# Patient Record
Sex: MALE | Race: WHITE | NOT HISPANIC OR LATINO | Employment: FULL TIME | URBAN - METROPOLITAN AREA
[De-identification: names, ages, dates, MRNs, and addresses within clinical notes are randomized per-mention and may not be internally consistent; named-entity substitution may affect disease eponyms.]

---

## 2019-11-01 ENCOUNTER — TRANSFERRED RECORDS (OUTPATIENT)
Dept: HEALTH INFORMATION MANAGEMENT | Facility: CLINIC | Age: 54
End: 2019-11-01

## 2020-12-16 ENCOUNTER — TRANSFERRED RECORDS (OUTPATIENT)
Dept: HEALTH INFORMATION MANAGEMENT | Facility: CLINIC | Age: 55
End: 2020-12-16

## 2021-01-06 ENCOUNTER — TRANSFERRED RECORDS (OUTPATIENT)
Dept: HEALTH INFORMATION MANAGEMENT | Facility: CLINIC | Age: 56
End: 2021-01-06

## 2021-01-07 ENCOUNTER — TRANSFERRED RECORDS (OUTPATIENT)
Dept: HEALTH INFORMATION MANAGEMENT | Facility: CLINIC | Age: 56
End: 2021-01-07

## 2021-01-08 ENCOUNTER — TRANSFERRED RECORDS (OUTPATIENT)
Dept: HEALTH INFORMATION MANAGEMENT | Facility: CLINIC | Age: 56
End: 2021-01-08

## 2021-01-08 ENCOUNTER — MEDICAL CORRESPONDENCE (OUTPATIENT)
Dept: HEALTH INFORMATION MANAGEMENT | Facility: CLINIC | Age: 56
End: 2021-01-08

## 2021-01-11 ENCOUNTER — TRANSCRIBE ORDERS (OUTPATIENT)
Dept: OTHER | Age: 56
End: 2021-01-11

## 2021-01-11 DIAGNOSIS — H70.10: ICD-10-CM

## 2021-01-11 DIAGNOSIS — H71.90: Primary | ICD-10-CM

## 2021-01-14 ENCOUNTER — TELEPHONE (OUTPATIENT)
Dept: OTOLARYNGOLOGY | Facility: CLINIC | Age: 56
End: 2021-01-14

## 2021-01-14 NOTE — TELEPHONE ENCOUNTER
Records Requested  01/14/21    Facility  [facility] recs fx. [phone]   Outcome Sent a fax for imaging reports and images

## 2021-01-14 NOTE — TELEPHONE ENCOUNTER
FUTURE VISIT INFORMATION      FUTURE VISIT INFORMATION:    Date: 2/4/2021    Time: 7:30AM    [place] [facility] [place]:    Referring provider:  Morales Blount MD    Referring providers clinic:  [facility]     Reason for visit/diagnosis  Cholesteatoma of ear & Mastoiditis, chronic // pe pt // Dr. Morales Blount at [facility] referring // recds sent to clinic    RECORDS REQUESTED FROM:       Clinic name Comments Records Status Imaging Status   [facility]  1/6/2021 note from Dr Blount     recs req 1/14/21 - received 1/14/21 11/1/2019 audiogram   12/23/2020, 12/16/2020 note from Dr Blount  12/9/2020, 11/1/2019 note from Dr Giovany Good    Images - req 1/14/21 - received   1/7/2021 CT IAC  1/7/2021 CT Maxillofacial  Scanned in EPIC req 1/14/21- received in  PACS   [facility] [place] 10/14/13 note from Dr Bennett  9/25/13 note from Dr Pete   8/8/2013 Audiogram     8/12/2013 Diode Laser Standby, Left Canal Wall Down Tympanomastoidectomy EPIC                              1/14/2021 9:27AM sent a fax to [facility] for audio and more recs - Amay   *received records from [facility] 2:34PM sent to scan. Images also received in PACS- Amay

## 2021-01-21 ENCOUNTER — DOCUMENTATION ONLY (OUTPATIENT)
Dept: CARE COORDINATION | Facility: CLINIC | Age: 56
End: 2021-01-21

## 2021-02-03 NOTE — PATIENT INSTRUCTIONS
1. You were seen in the ENT Clinic today by Dr. Pete.  If you have any questions or concerns after your appointment, please call   - Option 1: ENT Clinic: [phone]   - Option 2: [facility] (Dr. Pete's Nurse): [phone]    2.   Plan to return to clinic in 1 month with hearing test    Karo Lr LPN  [facility] - Otolaryngology

## 2021-02-04 ENCOUNTER — OFFICE VISIT (OUTPATIENT)
Dept: AUDIOLOGY | Facility: CLINIC | Age: 56
End: 2021-02-04
Payer: COMMERCIAL

## 2021-02-04 ENCOUNTER — OFFICE VISIT (OUTPATIENT)
Dept: OTOLARYNGOLOGY | Facility: CLINIC | Age: 56
End: 2021-02-04
Payer: COMMERCIAL

## 2021-02-04 ENCOUNTER — PRE VISIT (OUTPATIENT)
Dept: OTOLARYNGOLOGY | Facility: CLINIC | Age: 56
End: 2021-02-04

## 2021-02-04 VITALS
HEART RATE: 77 BPM | WEIGHT: 295.42 LBS | HEIGHT: 68 IN | TEMPERATURE: 98.5 F | OXYGEN SATURATION: 98 % | BODY MASS INDEX: 44.77 KG/M2

## 2021-02-04 DIAGNOSIS — H60.311 CHRONIC DIFFUSE OTITIS EXTERNA OF RIGHT EAR: Primary | ICD-10-CM

## 2021-02-04 DIAGNOSIS — H90.A31 MIXED CONDUCTIVE AND SENSORINEURAL HEARING LOSS OF RIGHT EAR WITH RESTRICTED HEARING OF LEFT EAR: Primary | ICD-10-CM

## 2021-02-04 DIAGNOSIS — E66.01 MORBID OBESITY (H): ICD-10-CM

## 2021-02-04 DIAGNOSIS — H90.0 CONDUCTIVE HEARING LOSS, BILATERAL: ICD-10-CM

## 2021-02-04 DIAGNOSIS — H65.93 OME (OTITIS MEDIA WITH EFFUSION), BILATERAL: ICD-10-CM

## 2021-02-04 DIAGNOSIS — H90.A12 CONDUCTIVE HEARING LOSS OF LEFT EAR WITH RESTRICTED HEARING OF RIGHT EAR: ICD-10-CM

## 2021-02-04 PROCEDURE — 87070 CULTURE OTHR SPECIMN AEROBIC: CPT | Mod: 90 | Performed by: PATHOLOGY

## 2021-02-04 PROCEDURE — 99000 SPECIMEN HANDLING OFFICE-LAB: CPT | Performed by: PATHOLOGY

## 2021-02-04 PROCEDURE — 87102 FUNGUS ISOLATION CULTURE: CPT | Mod: 90 | Performed by: PATHOLOGY

## 2021-02-04 PROCEDURE — 87107 FUNGI IDENTIFICATION MOLD: CPT | Mod: 90 | Performed by: PATHOLOGY

## 2021-02-04 PROCEDURE — 92557 COMPREHENSIVE HEARING TEST: CPT | Performed by: AUDIOLOGIST

## 2021-02-04 PROCEDURE — 99203 OFFICE O/P NEW LOW 30 MIN: CPT | Mod: 25 | Performed by: OTOLARYNGOLOGY

## 2021-02-04 PROCEDURE — 92567 TYMPANOMETRY: CPT | Mod: 52 | Performed by: AUDIOLOGIST

## 2021-02-04 PROCEDURE — 92504 EAR MICROSCOPY EXAMINATION: CPT | Performed by: OTOLARYNGOLOGY

## 2021-02-04 RX ORDER — LOSARTAN POTASSIUM 100 MG/1
100 TABLET ORAL
COMMUNITY

## 2021-02-04 ASSESSMENT — MIFFLIN-ST. JEOR: SCORE: 2139.5

## 2021-02-04 ASSESSMENT — PAIN SCALES - GENERAL: PAINLEVEL: NO PAIN (0)

## 2021-02-04 NOTE — PROGRESS NOTES
AUDIOLOGY REPORT    SUMMARY: Audiology visit completed. See audiogram for results.      RECOMMENDATIONS: Follow-up with ENT.          Torrie Freitas  Audiologist  [place] License  #[id number]

## 2021-02-04 NOTE — NURSING NOTE
"Chief Complaint   Patient presents with     Consult     Cholesteatoma of ear and mastoiditis       Pulse 77, temperature 98.5  F (36.9  C), temperature source Temporal, height 1.727 m (5' 8\"), weight 134 kg (295 lb 6.7 oz), SpO2 98 %.    Payton Kang, EMT    "

## 2021-02-04 NOTE — LETTER
2/4/2021       RE: Walter Dunn  [address]  [place] [facility] [address]     Dear Colleague,    Thank you for referring your patient, Walter Dunn, to the [facility] EAR NOSE AND THROAT CLINIC [place] at [facility]. Please see a copy of my visit note below.    Walter Dunn is seen in consultation from Dr. Blount. He underwent a left canal wall down tympanomastoidectomy in 2013 by myself for cholesteatoma and cholesterol granuloma. He has been seen at [facility] for left mastoid bowl debridement and has had no problems with his left ear. He does wear a hearing aid on that side. His right ear had been doing well except for this last year when he started to get some ear infections. His most recent infection started in December and has been ongoing since then despite drops and oral antibiotic therapy. His hearing is down which is problematic as his right ear used to be his better hearing ear. He has continued to have drainage although the pain is better.    Physical examination:  male in no acute distress.  Alert and answering questions appropriately.  HB 1/6 bilaterally.  Both ears examined under the microscope. Left mastoid cavity clean and healthy, there is a yellow effusion in the middle ear space with some air bubbles, TM is retracted with a band between the umbo and the superior canal. Right ear canal somewhat edematous with moisture and some wet cerumen which was cultured, TM thickened and there may be an effusion in the middle ear, no retractions noted.    Audiogram:  Audiogram from today was independently reviewed. Bilateral symmetric mild/moderate downsloping to severe primarily conductive loss with normal sensorineural hearing on the left and a normal downsloping to mild sensorineural component on the right, 96% speech discrimination on the right and 100% on the left, right flat normal volume tympanogram on the right,  tympanogram not done on the left. When compared to our audiogram from 2013, right hearing was normal and left had a moderate downsloping to severe conductive hearing loss.    Outside records from [place] were independently reviewed.   Notes were reviewed noting he was diagnosed with a fungal right otitis externa in December and his multiple courses of antibiotics and drops.   Audiogram from November 2019 showed right moderate upsloping to mild downsloping to moderate/severe conductive hearing loss and left mild downsloping to moderate/severe conductive hearing loss.   Outside CT from [place] from 1/7/2021 was independently reviewed. Left canal wall down mastoid cavity clean, stapes present with the TM on the stapes, no effusion noted. Right middle ear and mastoid with an effusion present, ossicular chain appears intact, no bone erosion noted.    Assessment and plan:  Right otitis media and right otitis media with effusion as well as left otitis media with effusion. We discussed that he would likely benefit from bilateral PE tube placement but that a PE tube cannot be placed on the right due to his active otitis externa. We will call with the culture results and get him on directed antimicrobial or antifungal therapy. He will follow dry ear precautions on the right. We'll see him back in a month and if the infection is improved, may place bilateral PE tubes. He understood and is agreeable with the plan.        Again, thank you for allowing me to participate in the care of your patient.      Sincerely,    Kristin Pete MD

## 2021-02-06 LAB
BACTERIA SPEC CULT: ABNORMAL
BACTERIA SPEC CULT: ABNORMAL
Lab: ABNORMAL
SPECIMEN SOURCE: ABNORMAL

## 2021-02-07 PROBLEM — E66.01 MORBID OBESITY (H): Status: ACTIVE | Noted: 2021-02-07

## 2021-02-07 NOTE — PROGRESS NOTES
Walter ANASTACIO Dunn is seen in consultation from Dr. Blount. He underwent a left canal wall down tympanomastoidectomy in 2013 by myself for cholesteatoma and cholesterol granuloma. He has been seen at [facility] for left mastoid bowl debridement and has had no problems with his left ear. He does wear a hearing aid on that side. His right ear had been doing well except for this last year when he started to get some ear infections. His most recent infection started in December and has been ongoing since then despite drops and oral antibiotic therapy. His hearing is down which is problematic as his right ear used to be his better hearing ear. He has continued to have drainage although the pain is better.    Physical examination:  male in no acute distress.  Alert and answering questions appropriately.  HB 1/6 bilaterally.  Both ears examined under the microscope. Left mastoid cavity clean and healthy, there is a yellow effusion in the middle ear space with some air bubbles, TM is retracted with a band between the umbo and the superior canal. Right ear canal somewhat edematous with moisture and some wet cerumen which was cultured, TM thickened and there may be an effusion in the middle ear, no retractions noted.    Audiogram:  Audiogram from today was independently reviewed. Bilateral symmetric mild/moderate downsloping to severe primarily conductive loss with normal sensorineural hearing on the left and a normal downsloping to mild sensorineural component on the right, 96% speech discrimination on the right and 100% on the left, right flat normal volume tympanogram on the right, tympanogram not done on the left. When compared to our audiogram from 2013, right hearing was normal and left had a moderate downsloping to severe conductive hearing loss.    Outside records from [facility] were independently reviewed.   Notes were reviewed noting he was diagnosed with a fungal right otitis externa in December and his  multiple courses of antibiotics and drops.   Audiogram from November 2019 showed right moderate upsloping to mild downsloping to moderate/severe conductive hearing loss and left mild downsloping to moderate/severe conductive hearing loss.   Outside CT from [place] from 1/7/2021 was independently reviewed. Left canal wall down mastoid cavity clean, stapes present with the TM on the stapes, no effusion noted. Right middle ear and mastoid with an effusion present, ossicular chain appears intact, no bone erosion noted.    Assessment and plan:  Right otitis media and right otitis media with effusion as well as left otitis media with effusion. We discussed that he would likely benefit from bilateral PE tube placement but that a PE tube cannot be placed on the right due to his active otitis externa. We will call with the culture results and get him on directed antimicrobial or antifungal therapy. He will follow dry ear precautions on the right. We'll see him back in a month and if the infection is improved, may place bilateral PE tubes. He understood and is agreeable with the plan.

## 2021-02-08 ENCOUNTER — TELEPHONE (OUTPATIENT)
Dept: OTOLARYNGOLOGY | Facility: CLINIC | Age: 56
End: 2021-02-08

## 2021-02-08 DIAGNOSIS — H92.11 OTORRHEA, RIGHT: Primary | ICD-10-CM

## 2021-02-08 NOTE — TELEPHONE ENCOUNTER
"\"Would you let him know that he's growing fungus on the right? Please put him on the compounded voriconazole drops 5 drops bid x 30 days. Keep the ear dry.\" per Dr. Pete      Pt is lindquist of above message. Pt was given pharmacy number to call if the don't call him.    Karo Lr LPN      "

## 2021-03-02 ENCOUNTER — TELEPHONE (OUTPATIENT)
Dept: OTOLARYNGOLOGY | Facility: CLINIC | Age: 56
End: 2021-03-02

## 2021-03-02 NOTE — TELEPHONE ENCOUNTER
Health Call Center    Phone Message    May a detailed message be left on voicemail: yes     Reason for Call: Other: Nurse Valorie from [facility] with Dr Blount called relaying a Pt message that he cannot afford the prescribed medication and wonders if there is another medication that Dr Pete would recommend. Also notes that Pt would prefer to have tubes inserted at [place] [facility] so that he does not have to travel into the city.     [facility] clinic would like to know that culture was done as well.     Please reach out to clinic to discuss at [phone] and ask for triage nurse. Thank you    Action Taken: Message routed to:  [facility] ENT    Travel Screening: Not Applicable

## 2021-03-02 NOTE — TELEPHONE ENCOUNTER
"Updated the nurse. We did an ear culture and fungal culture. Not sure about the alternative for the drops. Also they are fine doing the tubes but didn't want to step on Dr. Pete's \"toes\". Pt prefers going there for the tubes. Will update them once I hear back.    Karo Lr LPN    "

## 2021-03-03 DIAGNOSIS — H92.11 OTORRHEA, RIGHT: Primary | ICD-10-CM

## 2021-03-03 RX ORDER — CLOTRIMAZOLE 1 G/ML
SOLUTION TOPICAL
Qty: 15 ML | Refills: 0 | Status: SHIPPED | OUTPATIENT
Start: 2021-03-03 | End: 2021-03-17

## 2021-03-03 NOTE — TELEPHONE ENCOUNTER
"Pt notified of msg:    \"He can try clotrimazole to start with and go to [place] and have another culture done after 2-3 weeks of treatment. 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 Yes, they can do the tube once his infection has cleared\"    Per     Notified Valorie at [facility].    Karo Lr LPN    "

## 2021-03-04 LAB
FUNGUS SPEC CULT: NORMAL
Lab: NORMAL
SPECIMEN SOURCE: NORMAL

## 2022-11-01 ENCOUNTER — TRANSCRIBE ORDERS (OUTPATIENT)
Dept: OTHER | Age: 57
End: 2022-11-01

## 2022-11-01 DIAGNOSIS — H71.91 CHOLESTEATOMA OF RIGHT EAR: Primary | ICD-10-CM

## 2022-11-29 DIAGNOSIS — H90.0 CONDUCTIVE HEARING LOSS, BILATERAL: Primary | ICD-10-CM

## 2022-11-30 ENCOUNTER — TELEPHONE (OUTPATIENT)
Dept: OTOLARYNGOLOGY | Facility: CLINIC | Age: 57
End: 2022-11-30

## 2022-11-30 ENCOUNTER — OFFICE VISIT (OUTPATIENT)
Dept: OTOLARYNGOLOGY | Facility: CLINIC | Age: 57
End: 2022-11-30
Payer: COMMERCIAL

## 2022-11-30 ENCOUNTER — OFFICE VISIT (OUTPATIENT)
Dept: AUDIOLOGY | Facility: CLINIC | Age: 57
End: 2022-11-30
Payer: COMMERCIAL

## 2022-11-30 VITALS
HEIGHT: 68 IN | OXYGEN SATURATION: 100 % | BODY MASS INDEX: 43.33 KG/M2 | DIASTOLIC BLOOD PRESSURE: 90 MMHG | WEIGHT: 285.9 LBS | SYSTOLIC BLOOD PRESSURE: 146 MMHG | HEART RATE: 64 BPM

## 2022-11-30 DIAGNOSIS — H71.91 CHOLESTEATOMA OF RIGHT EAR: ICD-10-CM

## 2022-11-30 DIAGNOSIS — H90.0 CONDUCTIVE HEARING LOSS, BILATERAL: Primary | ICD-10-CM

## 2022-11-30 PROCEDURE — 99214 OFFICE O/P EST MOD 30 MIN: CPT | Mod: 25 | Performed by: OTOLARYNGOLOGY

## 2022-11-30 PROCEDURE — 92550 TYMPANOMETRY & REFLEX THRESH: CPT | Mod: 52 | Performed by: AUDIOLOGIST

## 2022-11-30 PROCEDURE — 92557 COMPREHENSIVE HEARING TEST: CPT | Performed by: AUDIOLOGIST

## 2022-11-30 PROCEDURE — 92504 EAR MICROSCOPY EXAMINATION: CPT | Mod: GC | Performed by: OTOLARYNGOLOGY

## 2022-11-30 RX ORDER — ACETAMINOPHEN 325 MG/1
975 TABLET ORAL ONCE
Status: CANCELLED | OUTPATIENT
Start: 2022-11-30 | End: 2022-11-30

## 2022-11-30 RX ORDER — CEFAZOLIN SODIUM IN 0.9 % NACL 3 G/100 ML
3 INTRAVENOUS SOLUTION, PIGGYBACK (ML) INTRAVENOUS SEE ADMIN INSTRUCTIONS
Status: CANCELLED | OUTPATIENT
Start: 2022-11-30

## 2022-11-30 RX ORDER — CEFAZOLIN SODIUM IN 0.9 % NACL 3 G/100 ML
3 INTRAVENOUS SOLUTION, PIGGYBACK (ML) INTRAVENOUS
Status: CANCELLED | OUTPATIENT
Start: 2022-11-30

## 2022-11-30 RX ORDER — ATORVASTATIN CALCIUM 20 MG/1
20 TABLET, FILM COATED ORAL DAILY
COMMUNITY
Start: 2022-10-14

## 2022-11-30 RX ORDER — DEXAMETHASONE SODIUM PHOSPHATE 4 MG/ML
10 INJECTION, SOLUTION INTRA-ARTICULAR; INTRALESIONAL; INTRAMUSCULAR; INTRAVENOUS; SOFT TISSUE ONCE
Status: CANCELLED | OUTPATIENT
Start: 2022-11-30 | End: 2022-11-30

## 2022-11-30 RX ORDER — PREDNISOLONE ACETATE 10 MG/ML
SUSPENSION/ DROPS OPHTHALMIC
COMMUNITY
Start: 2022-10-03 | End: 2023-09-21

## 2022-11-30 ASSESSMENT — PAIN SCALES - GENERAL: PAINLEVEL: NO PAIN (0)

## 2022-11-30 NOTE — LETTER
2022       RE: Walter Dunn  [address]  [place] [place] [facility] [address]     Dear Colleague,    Thank you for referring your patient, Walter Dunn, to the [facility] EAR NOSE AND THROAT CLINIC [place] at [facility]. Please see a copy of my visit note below.    Neurotology Clinic        Name: Walter Dunn  MRN: [id number]  Age: 57 year old year old  : 1965  Referring provider: Asael Galarza MD  Chief complaint: Right cholesteatoma    History of Present Illness:  Walter Dunn is seen in consultation from Asael Galarza MD.  He underwent a left canal wall down tympanomastoidectomy in  at [facility] for cholesteatoma and cholesterol granuloma. He has been seen at [facility] for left mastoid bowl debridement and has had no problems with his left ear. He does wear a hearing aid on that side. He was last seen here one year ago 21 when he was having issues with right sided ear infections requiring drops and oral antibiotics and worsened hearing on that side. We noted bilateral otitis media and right otitis externa. He was placed on dry ear precautions and drops and was told he will need PE tube placement once the otitis externa resolved. He ended up following up with his outside ENT at [place] and a TM perforation was noted on the right side so a tube was never placed. He continued to follow with that ENT with intermittent episodes of right ear drainage but was not put on drops. Eventually there became concern for cholesteatoma on the right side and he went to a different ENT for a second opinion where the cholesteatoma was again noted and a repeat CT was obtained (currently requesting records for this). He was told they could do the surgery for removal however given that he had previous surgery at [facility], he could come here as well so he chose to come back.     He says he was recently placed on drops 3 weeks ago and was told to  "continue them until his surgery. He does not know why he was placed on these as he says his ear was not and has not begun draining. He thinks his hearing is slightly better since he saw the new ENT provider who \"pulled on his ear drum\" and now it feels like it popped and can hear again. He denies any issues with his left ear over the past year.     Past Medical History:   Diagnosis Date     Broken nose      Hearing loss      Kidney stone      Nasal congestion      Sneezing      Sore throat     bother by uvula hanging too low and bothers him and makes him swallow     Tinnitus      Tobacco use      Weight gains      Past Surgical History:   Procedure Laterality Date     HERNIA REPAIR      x2     LASER DIODE TYMPANOMASTOIDECTOMY  2013    Procedure: LASER DIODE TYMPANOMASTOIDECTOMY;  Diode Laser Standby, Left Canal Wall Down Tympanomastoidectomy;  Surgeon: Kristin Pete MD;  Location: [facility] OR     LEG SURGERY      with screws and pins     Family History   Problem Relation Age of Onset     Allergies Unknown      Cardiovascular Father      Circulatory Father      Eye Disorder Unknown      Cancer Brother      Cancer Sister      Social History     Tobacco Use     Smoking status: Former     Types: Cigarettes     Quit date: 10/4/1982     Years since quittin.1     Smokeless tobacco: Current     Types: Chew     Tobacco comments:     Patient chew tobacco   Substance Use Topics     Alcohol use: No     Drug use: No   10 point review of systems is negative other than what is noted in HPI.     Physical examination:  Constitutional:  In no acute distress, appears stated age  Eyes:  Extraocular movements intact, no spontaneous nystagmus  Ears:  Both ears examined under the microscope.  - Right: External ear canal clear and healthy appearing. Over the posterior superior TM in pars flaccida there is a retraction pocket that extends down to mesotympanum with cholesteatoma seated inside. The mass appears to extending inferiorly in " the retraction pocket and the posterior extent cannot be visualized. Cholesteatoma unable to be suctioned or removed due to discomfort.   - Left: Mastoid cavity appears quite clean and healthy without significant crusting or buildup. TM is intact and middle ear appears well aerated.   Respiratory:  No increased work of breathing, wheezing or stridor  Musculoskeletal:  Good upper extremity strength  Skin:  No rashes on the head and neck  Neurologic:  House Brackman 1/6 bilaterally, ambulating normally  Psychiatric:  Alert, normal affect, answering questions appropriately     Audiogram: Audiogram from today was independently reviewed. Right primarily conductive hearing loss with a rapid drop at 8Khz to 70dB, left mild downsloping to moderate/severe conductive hearing loss.      Right: Speech reception threshold is 45 dB with 100% word recognition. Tympanogram C type   Left: Speech reception threshold is 45 dB with 100% word recognition. Tympanogram CNT     Audiogram was independently reviewed and compared to multiple older and outside audiograms.   Left hearing relatively unchanged compared to 2021, right hearing improved in the higher frequencies.  Compared to 2013, the left is relatively unchanged but the right has declined as it was in the normal to mild range.    Imaging: CT IAC was independently reviewed.   Right sided epitympanic cholesteatoma that appears to extend medial to the incus with associated erosion of the long process although the stapes may be intact. Posteriorly it does extend into the antrum. Mastoid is relatively well developed and partially opacified.    Outside notes from Dr. Galarza were independently reviewed and summarized above.    Assessment and plan:  Walter Dunn is a 57 year old male with a history of left CWD tympanomastoidectomy in 2013 for cholesteatoma and cholesterol granuloma who presents with new right sided cholesteatoma. During our clinic visit we did not have access to his  CT and so we had discussed the possibility of removing it transcanal, however after his visit we were able to visualize his imaging and given the size and location of the cholesteatoma extending into the antrum, he will likely need a tympanomastoidectomy. We will call him with this updated information. We did discuss the need for a second look procedure. He will need a tube placed long term in that ear which may be placed in the first procedure vs the second. Discussed risks which include but are not limited to: worsened hearing which may require further surgery, profound and irreversible hearing loss, dizziness, damage to the taste nerve, damage to the facial nerve, tympanic membrane perforation requiring further surgery and infection. It is likely that his hearing will be worse after the first surgery as a portion of the ossicular chain will likely need to be removed. Postoperative restrictions were discussed. The patient expressed understanding and is in agreement with this plan.  All questions were answered.    Teri López MD  Otolaryngology-Head & Neck Surgery PGY2    I, Kristin Pete MD, saw this patient with the resident/fellow and agree with the resident/fellow s findings and plan of care as documented in the resident s/fellow s note. I was present for the entire procedure.    Kristin Pete MD

## 2022-11-30 NOTE — PROGRESS NOTES
"  Neurotology Clinic        Name: Walter Dunn  MRN: [id number]  Age: 57 year old year old  : 1965  Referring provider: Asael Galarza MD  Chief complaint: Right cholesteatoma    History of Present Illness:  Walter Dunn is seen in consultation from Asael Galarza MD.  He underwent a left canal wall down tympanomastoidectomy in  at [facility] for cholesteatoma and cholesterol granuloma. He has been seen at [facility] for left mastoid bowl debridement and has had no problems with his left ear. He does wear a hearing aid on that side. He was last seen here one year ago 21 when he was having issues with right sided ear infections requiring drops and oral antibiotics and worsened hearing on that side. We noted bilateral otitis media and right otitis externa. He was placed on dry ear precautions and drops and was told he will need PE tube placement once the otitis externa resolved. He ended up following up with his outside ENT at [facility] and a TM perforation was noted on the right side so a tube was never placed. He continued to follow with that ENT with intermittent episodes of right ear drainage but was not put on drops. Eventually there became concern for cholesteatoma on the right side and he went to a different ENT for a second opinion where the cholesteatoma was again noted and a repeat CT was obtained (currently requesting records for this). He was told they could do the surgery for removal however given that he had previous surgery at [facility], he could come here as well so he chose to come back.     He says he was recently placed on drops 3 weeks ago and was told to continue them until his surgery. He does not know why he was placed on these as he says his ear was not and has not begun draining. He thinks his hearing is slightly better since he saw the new ENT provider who \"pulled on his ear drum\" and now it feels like it popped and can hear again. He denies any issues with his left " ear over the past year.     Past Medical History:   Diagnosis Date     Broken nose      Hearing loss      Kidney stone      Nasal congestion      Sneezing      Sore throat     bother by uvula hanging too low and bothers him and makes him swallow     Tinnitus      Tobacco use      Weight gains      Past Surgical History:   Procedure Laterality Date     HERNIA REPAIR      x2     LASER DIODE TYMPANOMASTOIDECTOMY  2013    Procedure: LASER DIODE TYMPANOMASTOIDECTOMY;  Diode Laser [place], Left Canal Wall Down Tympanomastoidectomy;  Surgeon: Kristin Pete MD;  Location: [facility] OR     LEG SURGERY      with screws and pins     Family History   Problem Relation Age of Onset     Allergies Unknown      Cardiovascular Father      Circulatory Father      Eye Disorder Unknown      Cancer Brother      Cancer Sister      Social History     Tobacco Use     Smoking status: Former     Types: Cigarettes     Quit date: 10/4/1982     Years since quittin.1     Smokeless tobacco: Current     Types: Chew     Tobacco comments:     Patient chew tobacco   Substance Use Topics     Alcohol use: No     Drug use: No   10 point review of systems is negative other than what is noted in HPI.     Physical examination:  Constitutional:  In no acute distress, appears stated age  Eyes:  Extraocular movements intact, no spontaneous nystagmus  Ears:  Both ears examined under the microscope.  - Right: External ear canal clear and healthy appearing. Over the posterior superior TM in pars flaccida there is a retraction pocket that extends down to mesotympanum with cholesteatoma seated inside. The mass appears to extending inferiorly in the retraction pocket and the posterior extent cannot be visualized. Cholesteatoma unable to be suctioned or removed due to discomfort.   - Left: Mastoid cavity appears quite clean and healthy without significant crusting or buildup. TM is intact and middle ear appears well aerated.   Respiratory:  No increased work of  breathing, wheezing or stridor  Musculoskeletal:  Good upper extremity strength  Skin:  No rashes on the head and neck  Neurologic:  House Brackman 1/6 bilaterally, ambulating normally  Psychiatric:  Alert, normal affect, answering questions appropriately     Audiogram: Audiogram from today was independently reviewed. Right primarily conductive hearing loss with a rapid drop at 8Khz to 70dB, left mild downsloping to moderate/severe conductive hearing loss.      Right: Speech reception threshold is 45 dB with 100% word recognition. Tympanogram C type   Left: Speech reception threshold is 45 dB with 100% word recognition. Tympanogram CNT     Audiogram was independently reviewed and compared to multiple older and outside audiograms.   Left hearing relatively unchanged compared to 2021, right hearing improved in the higher frequencies.  Compared to 2013, the left is relatively unchanged but the right has declined as it was in the normal to mild range.    Imaging: CT IAC was independently reviewed.   Right sided epitympanic cholesteatoma that appears to extend medial to the incus with associated erosion of the long process although the stapes may be intact. Posteriorly it does extend into the antrum. Mastoid is relatively well developed and partially opacified.    Outside notes from Dr. Galarza were independently reviewed and summarized above.    Assessment and plan:  Walter Dunn is a 57 year old male with a history of left CWD tympanomastoidectomy in 2013 for cholesteatoma and cholesterol granuloma who presents with new right sided cholesteatoma. During our clinic visit we did not have access to his CT and so we had discussed the possibility of removing it transcanal, however after his visit we were able to visualize his imaging and given the size and location of the cholesteatoma extending into the antrum, he will likely need a tympanomastoidectomy. We will call him with this updated information. We did discuss  the need for a second look procedure. He will need a tube placed long term in that ear which may be placed in the first procedure vs the second. Discussed risks which include but are not limited to: worsened hearing which may require further surgery, profound and irreversible hearing loss, dizziness, damage to the taste nerve, damage to the facial nerve, tympanic membrane perforation requiring further surgery and infection. It is likely that his hearing will be worse after the first surgery as a portion of the ossicular chain will likely need to be removed. Postoperative restrictions were discussed. The patient expressed understanding and is in agreement with this plan.  All questions were answered.    Teri López MD  Otolaryngology-Head & Neck Surgery PGY2    I, Kristin Pete MD, saw this patient with the resident/fellow and agree with the resident/fellow s findings and plan of care as documented in the resident s/fellow s note. I was present for the entire procedure.    Kristin Pete MD

## 2022-11-30 NOTE — TELEPHONE ENCOUNTER
Records Requested   November 30, 2022 9:25 AM  [id number]   Facility  [facility] Everywhere      Outcome Patient signed auth in clinic, sent to scan. Reports in care everywhere, images will be pushed shortly :  10/13/22 CT Temporal Bone   11/12/21 CT Temporal Bone

## 2022-11-30 NOTE — PROGRESS NOTES
AUDIOLOGY REPORT    SUMMARY: Audiology visit completed. See audiogram for results.      RECOMMENDATIONS: Follow-up with ENT.      Zak Gonzalez.  Licensed Audiologist  [place] #[id number]

## 2022-12-05 ENCOUNTER — TELEPHONE (OUTPATIENT)
Dept: OTOLARYNGOLOGY | Facility: CLINIC | Age: 57
End: 2022-12-05

## 2022-12-05 NOTE — TELEPHONE ENCOUNTER
Called patient to schedule surgery with Dr. Pete    Date of Surgery: 5/10/23 - offered patient 2/10 he said if he cant have the surgery before 2/1 it will need to be after 5/1    Location of surgery: [facility]    Pre-Op H&P: PCP RYNE Gustafson    Pre/Post Imaging:  Not Applicable    Discussed COVID-19 Testing: Not Applicable    Post-Op Appt Date: 3 weeks    Surgery Packet Mailed: 12/5      Additional comments: patient asked if he should continue using his ear drops. I told him that I would have to send a message over to Dr. Frazier nurse as I am unsure.        Carmelita Cadena on 12/5/2022 at 1:04 PM

## 2022-12-06 NOTE — TELEPHONE ENCOUNTER
Called patient and scheduled him at [place] on 1/20    Carmelita Cadena, Surgery Scheduler 12/6/2022 at 10:26 AM              ----- Message -----   From: Kristin Pete MD   Sent: 12/6/2022   8:33 AM CST   To: Kiersten Moura. Could you put him in 1/20 at [place]? May is too long. Things will be chewed off for sure by then. Thanks.

## 2022-12-22 ENCOUNTER — TELEPHONE (OUTPATIENT)
Dept: OTOLARYNGOLOGY | Facility: CLINIC | Age: 57
End: 2022-12-22

## 2022-12-27 NOTE — TELEPHONE ENCOUNTER
Patient called back stating that someone called regarding his pre-op and scheduling after surgery.     Instructed patient to call his PCP for a pre-op and his POST op is scheduled for him. He would like a reminder for surgery. Informed him that this would automatically sent and they will call 2-3 days prior to surgery with arrival time and instructions.

## 2023-01-11 ENCOUNTER — TRANSFERRED RECORDS (OUTPATIENT)
Dept: MULTI SPECIALTY CLINIC | Facility: CLINIC | Age: 58
End: 2023-01-11

## 2023-01-11 LAB
ALT SERPL-CCNC: 67 U/L (ref 21–72)
AST SERPL-CCNC: 39 U/L (ref 17–45)
CHOLESTEROL (EXTERNAL): 130 MG/DL (ref 0–200)
CREATININE (EXTERNAL): 0.8 MG/DL (ref 0.8–1.5)
GFR ESTIMATED (EXTERNAL): 101 ML/MIN (ref 60–137)
GLUCOSE (EXTERNAL): 96 MG/DL (ref 70–110)
HDLC SERPL-MCNC: 38 MG/DL (ref 34–100)
LDL CHOLESTEROL CALCULATED (EXTERNAL): 34 MG/DL (ref 75–130)
POTASSIUM (EXTERNAL): 4.5 MMOL/L (ref 3.5–5.5)
TRIGLYCERIDES (EXTERNAL): 288 MG/DL (ref 0–200)

## 2023-01-18 ENCOUNTER — TELEPHONE (OUTPATIENT)
Dept: OTOLARYNGOLOGY | Facility: CLINIC | Age: 58
End: 2023-01-18
Payer: COMMERCIAL

## 2023-01-18 NOTE — TELEPHONE ENCOUNTER
Spoke with patient to answer any questions he may have. Informed him our [facility] office would be reaching out to discuss arrival and NPO times.    Anna C. Schoenecker on 1/18/2023 at 12:18 PM     Was the patient seen in the last year in this department? Yes    Does patient have an active prescription for medications requested? Yes    Received Request Via: Pharmacy

## 2023-01-19 ENCOUNTER — ANESTHESIA EVENT (OUTPATIENT)
Dept: SURGERY | Facility: CLINIC | Age: 58
End: 2023-01-19
Payer: COMMERCIAL

## 2023-01-19 ASSESSMENT — COPD QUESTIONNAIRES
COPD: 1
CAT_SEVERITY: MILD

## 2023-01-19 ASSESSMENT — LIFESTYLE VARIABLES: TOBACCO_USE: 1

## 2023-01-19 NOTE — ANESTHESIA PREPROCEDURE EVALUATION
Anesthesia Pre-Procedure Evaluation    Patient: Walter Dunn   MRN: [id number] : 1965        Procedure : Procedure(s):  RIGHT TYMPANOMASTOIDECTOMY, WITH FACIAL NERVE MONITORING, WITH CARTILAGE BACKING  COMBINED MYRINGOTOMY, INSERT TUBE          Past Medical History:   Diagnosis Date     Broken nose      Hearing loss      Hypertension      Kidney stone      Nasal congestion      Sneezing      Sore throat     bother by uvula hanging too low and bothers him and makes him swallow     Tinnitus      Tobacco use      Weight gains       Past Surgical History:   Procedure Laterality Date     HERNIA REPAIR      x2     LASER DIODE TYMPANOMASTOIDECTOMY  2013    Procedure: LASER DIODE TYMPANOMASTOIDECTOMY;  Diode Laser [place], Left Canal Wall Down Tympanomastoidectomy;  Surgeon: Kristin Pete MD;  Location: [facility] OR     LEG SURGERY      with screws and pins      Allergies   Allergen Reactions     Contrast Dye Shortness Of Breath     Vicodin [Hydrocodone-Acetaminophen] Nausea     Oxycodone-Acetaminophen Anxiety      Social History     Tobacco Use     Smoking status: Former     Types: Cigarettes     Quit date: 10/4/1982     Years since quittin.3     Smokeless tobacco: Former     Types: Chew     Tobacco comments:     Patient chew tobacco   Substance Use Topics     Alcohol use: No      Wt Readings from Last 1 Encounters:   22 129.7 kg (285 lb 14.4 oz)        Anesthesia Evaluation   Pt has had prior anesthetic. Type: General.    No history of anesthetic complications       ROS/MED HX  ENT/Pulmonary: Comment: Cholesteatoma  Of note, he had a trache when he was 1 after swallowing a tack. Decannulated.    (+) GELY risk factors, snores loudly, hypertension, obese, tobacco use, Past use, mild,  COPD,     Neurologic:    (-) no seizures and no CVA   Cardiovascular:     (+) Dyslipidemia hypertension----- (-) murmur   METS/Exercise Tolerance: >4 METS    Hematologic:  - neg hematologic  ROS     Musculoskeletal:        GI/Hepatic:  - neg GI/hepatic ROS     Renal/Genitourinary:     (+) Nephrolithiasis ,     Endo:     (+) Obesity,     Psychiatric/Substance Use:       Infectious Disease:       Malignancy:       Other:      (+) , H/O Chronic Pain,        Physical Exam    Airway        Mallampati: III   TM distance: < 3 FB   Neck ROM: full   Mouth opening: > 3 cm    Respiratory Devices and Support         Dental       (+) Minor Abnormalities - some fillings, tiny chips    B=Bridge, C=Chipped, L=Loose, M=Missing    Cardiovascular   cardiovascular exam normal       Rhythm and rate: regular and normal (-) no murmur    Pulmonary   pulmonary exam normal        breath sounds clear to auscultation           OUTSIDE LABS:  CBC: No results found for: WBC, HGB, HCT, PLT  BMP: No results found for: NA, POTASSIUM, CHLORIDE, CO2, BUN, CR, GLC  COAGS: No results found for: PTT, INR, FIBR  POC: No results found for: BGM, HCG, HCGS  HEPATIC: No results found for: ALBUMIN, PROTTOTAL, ALT, AST, GGT, ALKPHOS, BILITOTAL, BILIDIRECT, ERIBERTO  OTHER: No results found for: PH, LACT, A1C, SERGEY, PHOS, MAG, LIPASE, AMYLASE, TSH, T4, T3, CRP, SED    Anesthesia Plan    ASA Status:  3   NPO Status:  NPO Appropriate    Anesthesia Type: General.     - Airway: ETT   Induction: Intravenous.   Maintenance: TIVA.   Techniques and Equipment:     - Airway: Video-Laryngoscope, Shoulder Afrhat/Ramp       - Drips/Meds: Remifentanil     Consents    Anesthesia Plan(s) and associated risks, benefits, and realistic alternatives discussed. Questions answered and patient/representative(s) expressed understanding.    - Discussed:     - Discussed with:  Patient      - Extended Intubation/Ventilatory Support Discussed: No.      - Patient is DNR/DNI Status: No    Use of blood products discussed: No .     Postoperative Care    Pain management: Multi-modal analgesia, Oral pain medications, IV analgesics.   PONV prophylaxis: Ondansetron (or other 5HT-3), Dexamethasone or Solumedrol      Comments:    Other Comments: Discussed risks of anesthesia including nausea, vomiting, sore throat, dental damage, cardiopulmonary complications, neurologic complication, and serious complications. Discussed with patient that he has several risk factors for sleep apnea, which if he has, could raise risk of perioperative complications.            Ivet Galarza MD

## 2023-01-20 ENCOUNTER — ANESTHESIA (OUTPATIENT)
Dept: SURGERY | Facility: CLINIC | Age: 58
End: 2023-01-20
Payer: COMMERCIAL

## 2023-01-20 ENCOUNTER — HOSPITAL ENCOUNTER (OUTPATIENT)
Facility: CLINIC | Age: 58
Discharge: HOME | End: 2023-01-20
Attending: OTOLARYNGOLOGY | Admitting: OTOLARYNGOLOGY
Payer: COMMERCIAL

## 2023-01-20 VITALS
HEIGHT: 68 IN | RESPIRATION RATE: 18 BRPM | OXYGEN SATURATION: 93 % | WEIGHT: 281.53 LBS | DIASTOLIC BLOOD PRESSURE: 81 MMHG | TEMPERATURE: 97.3 F | SYSTOLIC BLOOD PRESSURE: 126 MMHG | HEART RATE: 79 BPM | BODY MASS INDEX: 42.67 KG/M2

## 2023-01-20 DIAGNOSIS — H71.91 CHOLESTEATOMA OF RIGHT EAR: ICD-10-CM

## 2023-01-20 DIAGNOSIS — G89.18 POSTOPERATIVE PAIN: Primary | ICD-10-CM

## 2023-01-20 PROCEDURE — 360N000077 HC SURGERY LEVEL 4, PER MIN: Performed by: OTOLARYNGOLOGY

## 2023-01-20 PROCEDURE — 370N000017 HC ANESTHESIA TECHNICAL FEE, PER MIN: Performed by: OTOLARYNGOLOGY

## 2023-01-20 PROCEDURE — 258N000003 HC RX IP 258 OP 636: Performed by: STUDENT IN AN ORGANIZED HEALTH CARE EDUCATION/TRAINING PROGRAM

## 2023-01-20 PROCEDURE — 250N000011 HC RX IP 250 OP 636: Performed by: NURSE ANESTHETIST, CERTIFIED REGISTERED

## 2023-01-20 PROCEDURE — 88304 TISSUE EXAM BY PATHOLOGIST: CPT | Mod: TC | Performed by: OTOLARYNGOLOGY

## 2023-01-20 PROCEDURE — 250N000009 HC RX 250: Performed by: NURSE ANESTHETIST, CERTIFIED REGISTERED

## 2023-01-20 PROCEDURE — 250N000009 HC RX 250: Performed by: STUDENT IN AN ORGANIZED HEALTH CARE EDUCATION/TRAINING PROGRAM

## 2023-01-20 PROCEDURE — 710N000010 HC RECOVERY PHASE 1, LEVEL 2, PER MIN: Performed by: OTOLARYNGOLOGY

## 2023-01-20 PROCEDURE — 250N000011 HC RX IP 250 OP 636: Performed by: STUDENT IN AN ORGANIZED HEALTH CARE EDUCATION/TRAINING PROGRAM

## 2023-01-20 PROCEDURE — 272N000001 HC OR GENERAL SUPPLY STERILE: Performed by: OTOLARYNGOLOGY

## 2023-01-20 PROCEDURE — 710N000012 HC RECOVERY PHASE 2, PER MINUTE: Performed by: OTOLARYNGOLOGY

## 2023-01-20 PROCEDURE — 250N000013 HC RX MED GY IP 250 OP 250 PS 637: Performed by: OTOLARYNGOLOGY

## 2023-01-20 PROCEDURE — 250N000011 HC RX IP 250 OP 636: Performed by: OTOLARYNGOLOGY

## 2023-01-20 PROCEDURE — 250N000013 HC RX MED GY IP 250 OP 250 PS 637: Performed by: STUDENT IN AN ORGANIZED HEALTH CARE EDUCATION/TRAINING PROGRAM

## 2023-01-20 PROCEDURE — 250N000009 HC RX 250: Performed by: OTOLARYNGOLOGY

## 2023-01-20 PROCEDURE — 250N000025 HC SEVOFLURANE, PER MIN: Performed by: OTOLARYNGOLOGY

## 2023-01-20 PROCEDURE — 999N000141 HC STATISTIC PRE-PROCEDURE NURSING ASSESSMENT: Performed by: OTOLARYNGOLOGY

## 2023-01-20 PROCEDURE — 258N000003 HC RX IP 258 OP 636: Performed by: NURSE ANESTHETIST, CERTIFIED REGISTERED

## 2023-01-20 PROCEDURE — 88304 TISSUE EXAM BY PATHOLOGIST: CPT | Mod: 26 | Performed by: PATHOLOGY

## 2023-01-20 PROCEDURE — 69631 REPAIR EARDRUM STRUCTURES: CPT | Mod: 22 | Performed by: OTOLARYNGOLOGY

## 2023-01-20 PROCEDURE — 21235 EAR CARTILAGE GRAFT: CPT | Mod: 59 | Performed by: OTOLARYNGOLOGY

## 2023-01-20 RX ORDER — BACITRACIN 500 [USP'U]/G
OINTMENT OPHTHALMIC PRN
Status: DISCONTINUED | OUTPATIENT
Start: 2023-01-20 | End: 2023-01-20 | Stop reason: HOSPADM

## 2023-01-20 RX ORDER — HYDROMORPHONE HYDROCHLORIDE 1 MG/ML
0.2 INJECTION, SOLUTION INTRAMUSCULAR; INTRAVENOUS; SUBCUTANEOUS EVERY 5 MIN PRN
Status: DISCONTINUED | OUTPATIENT
Start: 2023-01-20 | End: 2023-01-20 | Stop reason: HOSPADM

## 2023-01-20 RX ORDER — NALOXONE HYDROCHLORIDE 0.4 MG/ML
0.4 INJECTION, SOLUTION INTRAMUSCULAR; INTRAVENOUS; SUBCUTANEOUS
Status: DISCONTINUED | OUTPATIENT
Start: 2023-01-20 | End: 2023-01-20 | Stop reason: HOSPADM

## 2023-01-20 RX ORDER — FENTANYL CITRATE 50 UG/ML
INJECTION, SOLUTION INTRAMUSCULAR; INTRAVENOUS PRN
Status: DISCONTINUED | OUTPATIENT
Start: 2023-01-20 | End: 2023-01-20

## 2023-01-20 RX ORDER — SODIUM CHLORIDE, SODIUM LACTATE, POTASSIUM CHLORIDE, CALCIUM CHLORIDE 600; 310; 30; 20 MG/100ML; MG/100ML; MG/100ML; MG/100ML
INJECTION, SOLUTION INTRAVENOUS CONTINUOUS
Status: DISCONTINUED | OUTPATIENT
Start: 2023-01-20 | End: 2023-01-20 | Stop reason: HOSPADM

## 2023-01-20 RX ORDER — FENTANYL CITRATE 50 UG/ML
50 INJECTION, SOLUTION INTRAMUSCULAR; INTRAVENOUS EVERY 5 MIN PRN
Status: DISCONTINUED | OUTPATIENT
Start: 2023-01-20 | End: 2023-01-20 | Stop reason: HOSPADM

## 2023-01-20 RX ORDER — ONDANSETRON 2 MG/ML
INJECTION INTRAMUSCULAR; INTRAVENOUS PRN
Status: DISCONTINUED | OUTPATIENT
Start: 2023-01-20 | End: 2023-01-20

## 2023-01-20 RX ORDER — PROPOFOL 10 MG/ML
INJECTION, EMULSION INTRAVENOUS PRN
Status: DISCONTINUED | OUTPATIENT
Start: 2023-01-20 | End: 2023-01-20

## 2023-01-20 RX ORDER — GLYCOPYRROLATE 0.2 MG/ML
INJECTION, SOLUTION INTRAMUSCULAR; INTRAVENOUS PRN
Status: DISCONTINUED | OUTPATIENT
Start: 2023-01-20 | End: 2023-01-20

## 2023-01-20 RX ORDER — ONDANSETRON 4 MG/1
4 TABLET, ORALLY DISINTEGRATING ORAL EVERY 30 MIN PRN
Status: DISCONTINUED | OUTPATIENT
Start: 2023-01-20 | End: 2023-01-20 | Stop reason: HOSPADM

## 2023-01-20 RX ORDER — EPHEDRINE SULFATE 50 MG/ML
INJECTION, SOLUTION INTRAMUSCULAR; INTRAVENOUS; SUBCUTANEOUS PRN
Status: DISCONTINUED | OUTPATIENT
Start: 2023-01-20 | End: 2023-01-20

## 2023-01-20 RX ORDER — CEFAZOLIN SODIUM/WATER 3 G/30 ML
3 SYRINGE (ML) INTRAVENOUS
Status: DISCONTINUED | OUTPATIENT
Start: 2023-01-20 | End: 2023-01-20 | Stop reason: HOSPADM

## 2023-01-20 RX ORDER — OXYCODONE HYDROCHLORIDE 5 MG/1
5 TABLET ORAL EVERY 4 HOURS PRN
Status: DISCONTINUED | OUTPATIENT
Start: 2023-01-20 | End: 2023-01-20 | Stop reason: HOSPADM

## 2023-01-20 RX ORDER — CEFAZOLIN SODIUM/WATER 3 G/30 ML
3 SYRINGE (ML) INTRAVENOUS SEE ADMIN INSTRUCTIONS
Status: DISCONTINUED | OUTPATIENT
Start: 2023-01-20 | End: 2023-01-20 | Stop reason: HOSPADM

## 2023-01-20 RX ORDER — ONDANSETRON 2 MG/ML
4 INJECTION INTRAMUSCULAR; INTRAVENOUS EVERY 30 MIN PRN
Status: DISCONTINUED | OUTPATIENT
Start: 2023-01-20 | End: 2023-01-20 | Stop reason: HOSPADM

## 2023-01-20 RX ORDER — EPINEPHRINE NASAL SOLUTION 1 MG/ML
SOLUTION NASAL PRN
Status: DISCONTINUED | OUTPATIENT
Start: 2023-01-20 | End: 2023-01-20 | Stop reason: HOSPADM

## 2023-01-20 RX ORDER — LABETALOL HYDROCHLORIDE 5 MG/ML
10 INJECTION, SOLUTION INTRAVENOUS
Status: DISCONTINUED | OUTPATIENT
Start: 2023-01-20 | End: 2023-01-20 | Stop reason: HOSPADM

## 2023-01-20 RX ORDER — LIDOCAINE HYDROCHLORIDE 20 MG/ML
INJECTION, SOLUTION INFILTRATION; PERINEURAL PRN
Status: DISCONTINUED | OUTPATIENT
Start: 2023-01-20 | End: 2023-01-20

## 2023-01-20 RX ORDER — OXYCODONE HYDROCHLORIDE 5 MG/1
5 TABLET ORAL EVERY 6 HOURS PRN
Qty: 6 TABLET | Refills: 0 | Status: SHIPPED | OUTPATIENT
Start: 2023-01-20 | End: 2023-01-23

## 2023-01-20 RX ORDER — HYDROMORPHONE HYDROCHLORIDE 1 MG/ML
0.3 INJECTION, SOLUTION INTRAMUSCULAR; INTRAVENOUS; SUBCUTANEOUS EVERY 5 MIN PRN
Status: DISCONTINUED | OUTPATIENT
Start: 2023-01-20 | End: 2023-01-20 | Stop reason: HOSPADM

## 2023-01-20 RX ORDER — ACETAMINOPHEN 325 MG/1
975 TABLET ORAL ONCE
Status: COMPLETED | OUTPATIENT
Start: 2023-01-20 | End: 2023-01-20

## 2023-01-20 RX ORDER — OFLOXACIN 3 MG/ML
5 SOLUTION AURICULAR (OTIC) 2 TIMES DAILY
Qty: 10 ML | Refills: 0 | Status: SHIPPED | OUTPATIENT
Start: 2023-01-20 | End: 2023-02-09

## 2023-01-20 RX ORDER — FENTANYL CITRATE 50 UG/ML
25 INJECTION, SOLUTION INTRAMUSCULAR; INTRAVENOUS EVERY 5 MIN PRN
Status: DISCONTINUED | OUTPATIENT
Start: 2023-01-20 | End: 2023-01-20 | Stop reason: HOSPADM

## 2023-01-20 RX ORDER — SODIUM CHLORIDE, SODIUM LACTATE, POTASSIUM CHLORIDE, CALCIUM CHLORIDE 600; 310; 30; 20 MG/100ML; MG/100ML; MG/100ML; MG/100ML
INJECTION, SOLUTION INTRAVENOUS CONTINUOUS PRN
Status: DISCONTINUED | OUTPATIENT
Start: 2023-01-20 | End: 2023-01-20

## 2023-01-20 RX ORDER — NALOXONE HYDROCHLORIDE 0.4 MG/ML
0.2 INJECTION, SOLUTION INTRAMUSCULAR; INTRAVENOUS; SUBCUTANEOUS
Status: DISCONTINUED | OUTPATIENT
Start: 2023-01-20 | End: 2023-01-20 | Stop reason: HOSPADM

## 2023-01-20 RX ORDER — DEXAMETHASONE SODIUM PHOSPHATE 4 MG/ML
10 INJECTION, SOLUTION INTRA-ARTICULAR; INTRALESIONAL; INTRAMUSCULAR; INTRAVENOUS; SOFT TISSUE ONCE
Status: COMPLETED | OUTPATIENT
Start: 2023-01-20 | End: 2023-01-20

## 2023-01-20 RX ORDER — PROPOFOL 10 MG/ML
INJECTION, EMULSION INTRAVENOUS CONTINUOUS PRN
Status: DISCONTINUED | OUTPATIENT
Start: 2023-01-20 | End: 2023-01-20

## 2023-01-20 RX ADMIN — PROPOFOL 150 MG: 10 INJECTION, EMULSION INTRAVENOUS at 07:40

## 2023-01-20 RX ADMIN — SODIUM CHLORIDE, POTASSIUM CHLORIDE, SODIUM LACTATE AND CALCIUM CHLORIDE: 600; 310; 30; 20 INJECTION, SOLUTION INTRAVENOUS at 09:20

## 2023-01-20 RX ADMIN — Medication 5 MG: at 07:59

## 2023-01-20 RX ADMIN — PROPOFOL 50 MG: 10 INJECTION, EMULSION INTRAVENOUS at 07:42

## 2023-01-20 RX ADMIN — FENTANYL CITRATE 75 MCG: 50 INJECTION, SOLUTION INTRAMUSCULAR; INTRAVENOUS at 07:40

## 2023-01-20 RX ADMIN — SUGAMMADEX 140 MG: 100 INJECTION, SOLUTION INTRAVENOUS at 07:41

## 2023-01-20 RX ADMIN — PHENYLEPHRINE HYDROCHLORIDE 0.5 MCG/KG/MIN: 10 INJECTION INTRAVENOUS at 08:10

## 2023-01-20 RX ADMIN — PROPOFOL 150 MCG/KG/MIN: 10 INJECTION, EMULSION INTRAVENOUS at 07:46

## 2023-01-20 RX ADMIN — REMIFENTANIL HYDROCHLORIDE 0.15 MCG/KG/MIN: 1 INJECTION, POWDER, LYOPHILIZED, FOR SOLUTION INTRAVENOUS at 07:46

## 2023-01-20 RX ADMIN — Medication 3 G: at 07:54

## 2023-01-20 RX ADMIN — DEXAMETHASONE SODIUM PHOSPHATE 10 MG: 4 INJECTION, SOLUTION INTRAMUSCULAR; INTRAVENOUS at 07:51

## 2023-01-20 RX ADMIN — PHENYLEPHRINE HYDROCHLORIDE 0.5 MCG/KG/MIN: 10 INJECTION INTRAVENOUS at 08:42

## 2023-01-20 RX ADMIN — ONDANSETRON 4 MG: 2 INJECTION INTRAMUSCULAR; INTRAVENOUS at 10:32

## 2023-01-20 RX ADMIN — ACETAMINOPHEN 975 MG: 325 TABLET ORAL at 05:54

## 2023-01-20 RX ADMIN — SODIUM CHLORIDE, POTASSIUM CHLORIDE, SODIUM LACTATE AND CALCIUM CHLORIDE: 600; 310; 30; 20 INJECTION, SOLUTION INTRAVENOUS at 07:34

## 2023-01-20 RX ADMIN — Medication 10 MG: at 07:49

## 2023-01-20 RX ADMIN — Medication 10 MG: at 07:40

## 2023-01-20 RX ADMIN — PHENYLEPHRINE HYDROCHLORIDE 200 MCG: 10 INJECTION INTRAVENOUS at 07:59

## 2023-01-20 RX ADMIN — GLYCOPYRROLATE 0.2 MG: 0.2 INJECTION, SOLUTION INTRAMUSCULAR; INTRAVENOUS at 08:03

## 2023-01-20 RX ADMIN — Medication 5 MG: at 07:54

## 2023-01-20 RX ADMIN — Medication 5 MG: at 07:46

## 2023-01-20 RX ADMIN — LIDOCAINE HYDROCHLORIDE 100 MG: 20 INJECTION, SOLUTION INFILTRATION; PERINEURAL at 07:40

## 2023-01-20 RX ADMIN — OXYCODONE HYDROCHLORIDE 5 MG: 5 TABLET ORAL at 11:27

## 2023-01-20 RX ADMIN — Medication 5 MG: at 09:51

## 2023-01-20 ASSESSMENT — ACTIVITIES OF DAILY LIVING (ADL)
ADLS_ACUITY_SCORE: 22

## 2023-01-20 ASSESSMENT — ENCOUNTER SYMPTOMS: SEIZURES: 0

## 2023-01-20 NOTE — ANESTHESIA POSTPROCEDURE EVALUATION
Patient: Walter Dunn    Procedure: Procedure(s):  RIGHT TYMPANOPLASTY, WITH FACIAL NERVE MONITORING, WITH CARTILAGE BACKING  COMBINED MYRINGOTOMY, INSERT TUBE RIGHT EAR       Anesthesia Type:  General    Note:  Disposition: Outpatient   Postop Pain Control: Uneventful            Sign Out: Well controlled pain   PONV: No   Neuro/Psych: Uneventful            Sign Out: Acceptable/Baseline neuro status   Airway/Respiratory: Uneventful            Sign Out: Acceptable/Baseline resp. status   CV/Hemodynamics: Uneventful            Sign Out: Acceptable CV status; No obvious hypovolemia; No obvious fluid overload   Other NRE: NONE   DID A NON-ROUTINE EVENT OCCUR? No    Event details/Postop Comments:  Sats low 90s, as preop. Patient's questions answered. He feels as though he had urinary retention, but was able to urinate.           Last vitals:  Vitals Value Taken Time   /99 01/20/23 1128   Temp 36.3  C (97.3  F) 01/20/23 1128   Pulse 96 01/20/23 1122   Resp 18 01/20/23 1128   SpO2 90 % 01/20/23 1129   Vitals shown include unvalidated device data.    Electronically Signed By: Ivet Galarza MD  January 20, 2023  2:20 PM

## 2023-01-20 NOTE — ANESTHESIA CARE TRANSFER NOTE
Patient: Walter Dunn    Procedure: Procedure(s):  RIGHT TYMPANOPLASTY, WITH FACIAL NERVE MONITORING, WITH CARTILAGE BACKING  COMBINED MYRINGOTOMY, INSERT TUBE RIGHT EAR       Diagnosis: Cholesteatoma of right ear [H71.91]  Diagnosis Additional Information: No value filed.    Anesthesia Type:   General     Note:    Oropharynx: oropharynx clear of all foreign objects  Level of Consciousness: awake  Oxygen Supplementation: face mask    Independent Airway: airway patency satisfactory and stable  Dentition: dentition unchanged  Vital Signs Stable: post-procedure vital signs reviewed and stable  Report to RN Given: handoff report given  Patient transferred to: PACU    Handoff Report: Identifed the Patient, Identified the Reponsible Provider, Reviewed the pertinent medical history, Discussed the surgical course, Reviewed Intra-OP anesthesia mangement and issues during anesthesia, Set expectations for post-procedure period and Allowed opportunity for questions and acknowledgement of understanding      Vitals:  Vitals Value Taken Time   /92 01/20/23 1050   Temp     Pulse 93 01/20/23 1053   Resp 19 01/20/23 1053   SpO2 97 % 01/20/23 1053   Vitals shown include unvalidated device data.    Electronically Signed By: ISI Villanueva CRNA  January 20, 2023  10:54 AM

## 2023-01-20 NOTE — ANESTHESIA PROCEDURE NOTES
Airway       Patient location during procedure: OR       Procedure Start/Stop Times: 1/20/2023 7:43 AM  Staff -        CRNA: Naima Perkins APRN CRNA       Performed By: CRNA  Consent for Airway        Urgency: elective  Indications and Patient Condition       Indications for airway management: azam-procedural       Induction type:intravenous       Mask difficulty assessment: 1 - vent by mask    Final Airway Details       Final airway type: endotracheal airway       Successful airway: ETT - single and Oral  Endotracheal Airway Details        ETT size (mm): 7.5       Cuffed: yes       Successful intubation technique: video laryngoscopy       VL Blade Size: MAC D Blade       Grade View of Cords: 1       Adjucts: stylet       Position: Left       Measured from: gums/teeth       Secured at (cm): 24       Bite block used: None    Post intubation assessment        Placement verified by: capnometry, equal breath sounds and chest rise        Number of attempts at approach: 1       Secured with: silk tape       Ease of procedure: easy       Dentition: Intact and Unchanged    Medication(s) Administered   Medication Administration Time: 1/20/2023 7:43 AM

## 2023-01-20 NOTE — BRIEF OP NOTE
[facility]    Brief Operative Note    Pre-operative diagnosis: Cholesteatoma of right ear [H71.91]  Post-operative diagnosis Same as pre-operative diagnosis    Procedure:   Right tympanoplasty with facial nerve monitoring and cartilage backing  Right tragal cartilage harvest  Right myringotomy with pressure equalizing tube placement    Surgeon: Surgeon(s) and Role:     * Kristin Pete MD - Primary  Anesthesia: General   Estimated Blood Loss: 10mL    Drains: None  Specimens:   ID Type Source Tests Collected by Time Destination   1 : Right middle ear content Tissue Middle Ear Contents, Right SURGICAL PATHOLOGY EXAM Kristin Pete MD 1/20/2023  8:45 AM      Findings:   Cholesteatoma and granulation tissue involving the ossicles, epitympanum, sinus tympani. Almost complete erosion of incus and stapes.   Complications: None.  Implants: * No implants in log *

## 2023-01-20 NOTE — DISCHARGE INSTRUCTIONS
"1. Resume your home medications. Take pain medication, Tylenol or ibuprofen as needed. Use docusate to avoid constipation. Start your ear drops in 1 week after surgery and use until your follow up.    2. Wound care  * Change the cotton ball in your ear as needed for drainage.  It's normal to expect some drainage from your ear for the first few days after surgery.    3. Use a cotton ball coated with vasoline to keep water out of ear canal.    4. For the next week, no heavy lifting more than 5 pounds, no strenuous activities. No nose blowing. Sneeze with your mouth open.    5. Please call MD or come to the ED for shortness of breath, trouble breathing, inability to tolerate liquids, intractable dizziness, or signs of infection such as fevers or redness.      Call the [phone] clinic number if you have any questions and concerns during the day and call [phone] at night and ask for \"ENT resident on call\".     6. Follow up with Dr. Pete as previously scheduled.    Same-Day Surgery   Adult Discharge Orders & Instructions     For 24 hours after surgery:  Get plenty of rest.  A responsible adult must stay with you for at least 24 hours after you leave the hospital.   Pain medication can slow your reflexes. Do not drive or use heavy equipment.  If you have weakness or tingling, don't drive or use heavy equipment until this feeling goes away.  Mixing alcohol and pain medication can cause dizziness and slow your breathing. It can even be fatal. Do not drink alcohol while taking pain medication.  Avoid strenuous or risky activities.  Ask for help when climbing stairs.   You may feel lightheaded.  If so, sit for a few minutes before standing.  Have someone help you get up.   If you have nausea (feel sick to your stomach), drink only clear liquids such as apple juice, ginger ale, broth or 7-Up.  Rest may also help.  Be sure to drink enough fluids.  Move to a regular diet as you feel able. Take pain medications with a " small amount of solid food, such as toast or crackers, to avoid nausea.   A slight fever is normal. Call the doctor if your fever is over 100 F (37.7 C) (taken under the tongue) or lasts longer than 24 hours.  You may have a dry mouth, muscle aches, trouble sleeping or a sore throat.  These symptoms should go away after 24 hours.  Do not make important or legal decisions.   Pain Management:      1. Take pain medication (if prescribed) for pain as directed by your physician.        2. WARNING: If the pain medication you have been prescribed contains Tylenol  (acetaminophen), DO NOT take additional doses of Tylenol (acetaminophen).     Call your doctor for any of the followin.  Signs of infection (fever, growing tenderness at the surgery site, severe pain, a large amount of drainage or bleeding, foul-smelling drainage, redness, swelling).    2.  It has been over 8 to 10 hours since surgery and you are still not able to urinate (pee).    3.  Headache for over 24 hours.    4.  Numbness, tingling or weakness the day after surgery (if you had spinal anesthesia).  To contact a doctor, call Dr Pete's clinic at [phone] or:  '   [phone] and ask for the Resident On Call for:          ENT surgery(answered 24 hours a day)  '   Emergency Department:  [place] [facility]: [phone]  [place] [facility]: [phone]               Rev. 10/2014     [facility]  Kristin Pete MD    Caring for Yourself after Tympanoplasty or Mastoidectomy    What to expect after surgery:  Nausea, dizziness or unsteadiness: This is normal because the ear is involved with your sense of balance.  Crackling, popping or ringing sounds in the ear: This is normal and usually goes away in a few weeks.  Small to moderate amount of ear drainage (may be bloody) for 24-48 hours.    Care after surgery:  Keep head of bed up with 1-2 pillows (or use a folded blanket for infants) for about 1 week  after surgery.  Use the Yrn (Velcro cup ear covering) for the first 24 hours and then afterwards as needed.  Keep the ear dry with cotton ball and Vaseline if excessive drainage is noted. You may leave a cotton ball with Vaseline in the ear if drainage continues.  Change the cotton ball as needed.  In most cases, dissolvable packing is used in the ear. In some cases, gauze packing is used. Do not remove any packing from inside the ear canal. Your doctor will take out any removable packing at a clinic appointment.   After the packing has been removed, protect the ear by placing a cotton ball, swabbed in Vaseline, gently into the outer part of the ear before bathing or taking a shower. Do this until the ear canal is healed.    If glasses are worn, remove the bow on the incision (surgery wound) side. This will avoid pressure near the incision while it heals. Healing takes about 2 to 3 weeks.     Things to Avoid:  Do not loosen/remove the bandage or packing inside the ear canal.  Do not move quickly.  Do not shake your head.  Do not lie flat in bed.  Do not allow water, soap or shampoo to get into the ear canal. This could lead to infection.    Activity:  No heavy lifting, strenuous activity, contact sports, gym class for 2-3 weeks.  No air travel for 2-3 weeks after surgery.  No swimming for 2-3 weeks after surgery.  Sneeze with mouth open to prevent pressure from building up in the middle ear.     Pain:  A small to moderate amount of pain is expected after surgery. Give your child Tylenol or the pain medication that the doctor has prescribed.    Follow up:  ENT follow up in 2-3 weeks after surgery; this should be previously scheduled.  Your child s hearing will be checked by audiology three months after surgery to evaluate improvement; this should be previously scheduled.  If you need to schedule your clinic follow up exam, please call [phone].    When to call us:  Fresh blood, pus or swelling from the  incision  Fever higher than 100.5 F rectally or 99.5 F under the arm that lasts more than 24 hours  Extreme irritability of difficulty to console  Facial weakness  Persistent dizziness/unsteady gait that lasts more than 7 days    Important Phone Numbers:  [facility]--Pediatric ENT Clinic  During office hours: [phone]  After hours: [phone] (ask to page the Pediatric ENT resident who is on-call)    Rev. 5/2018

## 2023-01-20 NOTE — INTERVAL H&P NOTE
"I have reviewed the surgical (or preoperative) H&P that is linked to this encounter, and examined the patient. There are no significant changes    Clinical Conditions Present on Arrival:  Clinically Significant Risk Factors Present on Admission                    # Severe Obesity: Estimated body mass index is 42.81 kg/m  as calculated from the following:    Height as of this encounter: 1.727 m (5' 8\").    Weight as of this encounter: 127.7 kg (281 lb 8.4 oz).       "

## 2023-01-23 NOTE — OP NOTE
Date of service:  1/20/23    Preoperative diagnosis:  Cholesteatoma and eustachian tube dysfunction and morbid obesity (BMI 42.81)    Postoperative diagnosis:  Cholesteatoma, eustachian tube dysfunction and granulation tissue and morbid obesity (BMI 42.81)    Procedure:  1.  Right tympanoplasty with cartilage backing  2.  Right myringotomy and t-tube placement  3.  Facial nerve monitoring x 3 hours    Surgeon:  Kristin Pete MD    Resident:  Agustin Keane MD    Anesthesia:  General    EBL:  10cc    Specimens:  None    Complications:  None    Findings:  Cholesteatoma within the epitympanum, granulation tissue filling the mesotympanum and antrum, absent long process of the incus, absent stapes suprastructure other than a small posterior crural remnant, stapes footplate intact with mucosa overlying it.    Indications:  Walter Dunn is a patient with a right cholesteatoma and eustachian tube dysfunction.  Discussion was had about tympanomastoidectomy and t-tube placement, possible tympanoplasty.  Risks and benefits had been discussed and consent had been obtained.    Procedure:  The patient was taken to the operating room and placed supine on the operating room table. General anesthesia was induced and endotracheal intubation was performed. 70% nitrous was administered for 10 minutes. Time Out was then performed with confirmation of the patient, site and procedure. Facial nerve electrodes were placed on the right side of the face. Impedances were checked and the nerve was monitored for the entirety of the case.  1:100,000 epinephrine was injected into the ear canal, tragus and postauricular region.  The area was prepped and draped in standard surgical fashion.  The operating microscope was brought into position and used for the entirety of the case.  The ear canal was inspected and irrigated with normal saline.  The tympanic membrane was inspected. There is noted to be a deep mesotympanic retraction pocket that  goes posterior superior with some squamous debris within the pocket which was removed. The anterior TM was insufflated outwards but not the pocket. Myringotomy incision was made in the anterior quadrant and a t-tube was placed. No effusion was noted in the middle ear. Canal incisions were made and the tympanomeatal flap was elevated. The hypotympanum was entered and noted to be clear. As the TM was elevated superiorly, there is noted to be granulation tissue filling the mesotympanum and extending into the sinus tympani. The chorda tympani was identified and freed off the TM and the surrounding granulation tissue. There is a fair amount of bleeding from the granulation tissue. It was taken out of the sinus tympani. The mesotympanum was explored and no ossicles were palpable. There is noted to be significant scutal erosion from the retraction. The scutum was further curetted away in order to expose the antrum. This allowed the granulation tissue to be elevated out of the antrum. As this was done, there was noted to be cholesteatoma within the epitympanum but the tissue within the antrum appeared to be all granulation tissue. The granulation tissue out of the sinus tympani was able to be connected to the tissue within the antrum. The mesotympanum was again explored and the granulation tissue peeled anteriorly away from the sinus tympani. There was noted to be a remnant of a stapes posterior crura but no anterior crura or capitulum and no long process of the incus. The area of the stapes footplate was identified and the footplate was noted to be intact with some thick mucosa overlying it which was left in place as the footplate appeared hypermobile. The TM was incised anterior to the epitympanic retraction pocket and the TM taken off the neck of the malleus. There is cholesteatoma within the epitympanum. The neck of the malleus was cut and the head was removed. This allowed the anterior epitympanum to be explored and  cleaned of cholesteatoma. The tensor tympani was further excised. The chorda tympani is noted to be going through the cholesteatoma so it too was excised. Once the cholesteatoma and granulation tissue had been removed, the bleeding abated significantly. There was some thick mucosa on the floor of the middle ear but no further evidence of cholesteatoma. There was noted to be a perforation in the posterior superior quadrant where the retraction pocket with cholesteatoma was removed as well as an anterior perforation anterior to the umbo in continuity with the myringotomy for the t-tube. Tragal incision was made and an approximately 5mm x 7mm piece of cartilage with perichondrium was harvested. The incision was closed with chromic suture and the cartilage prepared for grafting purposes. The epitympanum and mesotympanum was filled with gelfoam. A cartilage graft with perichondrium was placed under the posterior superior perforation. The anterior middle ear was filled with gelfoam and a perichondrial graft was placed to cover the anterior perforation and placed against the t-tube. The tympanomeatal flap was placed back into position and gelfoam used to cover the grafts and seal the incision.  Bacitracin was used to fill the ear canal.  A cotton ball was placed over the ear canal and the facial nerve electrodes removed.  The patient tolerated the procedure well and counts were correct.  The patient was returned to Anesthesia, awakened, extubated and taken to the PACU in stable condition.    Please note this procedure was more difficult than usual due to the patient's morbid obesity making access to the ear difficult as well as the hypervascular nature of the granulation tissue. This resulted in at least 20% more time and effort to complete the case.

## 2023-01-24 LAB
PATH REPORT.COMMENTS IMP SPEC: NORMAL
PATH REPORT.COMMENTS IMP SPEC: NORMAL
PATH REPORT.FINAL DX SPEC: NORMAL
PATH REPORT.GROSS SPEC: NORMAL
PATH REPORT.MICROSCOPIC SPEC OTHER STN: NORMAL
PATH REPORT.RELEVANT HX SPEC: NORMAL
PHOTO IMAGE: NORMAL

## 2023-01-27 ENCOUNTER — TELEPHONE (OUTPATIENT)
Dept: OTOLARYNGOLOGY | Facility: CLINIC | Age: 58
End: 2023-01-27
Payer: COMMERCIAL

## 2023-01-27 NOTE — TELEPHONE ENCOUNTER
[facility] Call Center    Phone Message    May a detailed message be left on voicemail: yes     Reason for Call: Other: Per spouse she would like to know when and what they are to be doin with the packing that is inside his ears. Please reachoout to spouse. Thank you      Action Taken: Message routed to:  [facility] [place] [facility] [place]    [place] Screening: Not Applicable

## 2023-02-08 NOTE — PROGRESS NOTES
"  Otolaryngology Clinic      Name: Walter Dunn  MRN: [id number]  Age: 57 year old  : 2025      Chief Complaint:   Follow up    History of Present Illness:   Walter Dunn is a 57 year old male with a history of left canal wall down tympanomastoidectomy in  at [facility] for cholesteatoma, cholesterol granuloma, and right cholesteatoma s/p right tympanoplasty with cartilage backing and right myringotomy and t-tube placement 2023.    Today, he reports that his ear has been feeling okay. He thinks that most of the packing came out of the ear or dissolved. He had a few days where he felt his hearing was improved, but it now feels muffled.    Physical Exam:   /71   Pulse 80   Temp 98.2  F (36.8  C)   Ht 1.753 m (5' 9\")   Wt 131.1 kg (289 lb)   SpO2 94%   BMI 42.68 kg/m       Male in no acute distress.  Alert and answering questions appropriately.  HB 1/6 bilaterally.  Right ear examined under the microscope. Packing removed with suction. Some packing adhered to healing incision of EAC. T-tube in place and patent.     Assessment and Plan:  Walter Dunn is a 57 year old male who presents for follow up s/p right tympanoplasty with cartilage backing and right myringotomy and t-tube placement 2023. He is healing well, and his T-tube is in place and patent. I was able to suction some of the packing today. I recommended that he continue Floxin ear drops and start steroid ear drops. He should also continue to keep the ear dry. He can follow up with me in 1 month.      Scribe Disclosure:  I, Leonel Parks, am serving as a scribe to document services personally performed by Kristin Pete MD at this visit, based upon the provider's statements to me. All documentation has been reviewed by the aforementioned provider prior to being entered into the official medical record.    The documentation recorded by the scribe accurately reflects the services I personally performed and " the decisions made by me.

## 2023-02-09 ENCOUNTER — OFFICE VISIT (OUTPATIENT)
Dept: OTOLARYNGOLOGY | Facility: CLINIC | Age: 58
End: 2023-02-09
Payer: COMMERCIAL

## 2023-02-09 VITALS
WEIGHT: 289 LBS | HEART RATE: 80 BPM | SYSTOLIC BLOOD PRESSURE: 123 MMHG | HEIGHT: 69 IN | DIASTOLIC BLOOD PRESSURE: 71 MMHG | BODY MASS INDEX: 42.8 KG/M2 | OXYGEN SATURATION: 94 % | TEMPERATURE: 98.2 F

## 2023-02-09 DIAGNOSIS — H92.11 OTORRHEA, RIGHT: ICD-10-CM

## 2023-02-09 DIAGNOSIS — H90.0 CONDUCTIVE HEARING LOSS, BILATERAL: Primary | ICD-10-CM

## 2023-02-09 DIAGNOSIS — H71.91 CHOLESTEATOMA OF RIGHT EAR: ICD-10-CM

## 2023-02-09 PROCEDURE — 99024 POSTOP FOLLOW-UP VISIT: CPT | Performed by: OTOLARYNGOLOGY

## 2023-02-09 RX ORDER — OFLOXACIN 3 MG/ML
5 SOLUTION AURICULAR (OTIC) 2 TIMES DAILY
Qty: 10 ML | Refills: 0 | Status: SHIPPED | OUTPATIENT
Start: 2023-02-09 | End: 2023-07-28

## 2023-02-09 RX ORDER — PREDNISOLONE ACETATE 10 MG/ML
SUSPENSION/ DROPS OPHTHALMIC
Qty: 15 ML | Refills: 0 | Status: SHIPPED | OUTPATIENT
Start: 2023-02-09 | End: 2023-02-19

## 2023-02-09 ASSESSMENT — PAIN SCALES - GENERAL: PAINLEVEL: NO PAIN (0)

## 2023-02-09 NOTE — PATIENT INSTRUCTIONS
You were seen in the ENT Clinic today by Dr. Pete.  If you have any questions or concerns after your appointment, please call   - Option 1: ENT Clinic: [phone]   - Option 2: Karo (Dr. Pete's Nurse): [phone]                   Christine (Dr. Pete's Nurse): [phone]    2.   A refill of your ofloxacin drops have been sent as well as a new prescription for Pred Forte drops. You will apply both of these to the right ear. Please be sure to use the Pred Forte after the ofloxacin.    3.   Plan to return to clinic in 1 month with hearing test prior.        How to Contact Us:  Send a [employer] message to your provider. Our team will respond to you via [employer]. Occasionally, we will need to call you to get further information.  For urgent matters (Monday-Friday), call the ENT Clinic: [phone] and speak with a call center team member - they will route your call appropriately.   If you'd like to speak directly with a nurse, please find our contact information below. We do our best to check voicemail frequently throughout the day, and will work to call you back within 1-2 days. For urgent matters, please use the general clinic phone numbers listed above.

## 2023-02-09 NOTE — NURSING NOTE
"Chief Complaint   Patient presents with     RECHECK     Post op      Blood pressure 123/71, pulse 80, temperature 98.2  F (36.8  C), height 1.753 m (5' 9\"), weight 131.1 kg (289 lb), SpO2 94 %.    Olman Betts LPN    "

## 2023-02-09 NOTE — Clinical Note
"2023       RE: Walter Dunn  [address]  [place] [address]     Dear Colleague,    Thank you for referring your patient, Walter Dunn, to the [facility] [place] [facility]. Please see a copy of my visit note below.      Otolaryngology Clinic      Name: Walter Dunn  MRN: [id number]  Age: 57 year old  : 2025      Chief Complaint:   Follow up    History of Present Illness:   Walter Dunn is a 57 year old male with a history of left canal wall down tympanomastoidectomy in  at [facility] for cholesteatoma, cholesterol granuloma, and right cholesteatoma who presents for follow up s/p right tympanoplasty with cartilage backing and right myringotomy and t-tube placement 2023.    Today, he reports that his ear has been feeling okay. He thinks that most of the packing came out of the ear or dissolved. He had a few days where he felt his hearing was improved, but it now feels muffled.    Physical Exam:   /71   Pulse 80   Temp 98.2  F (36.8  C)   Ht 1.753 m (5' 9\")   Wt 131.1 kg (289 lb)   SpO2 94%   BMI 42.68 kg/m       Physical examination:  Male in no acute distress.  Alert and answering questions appropriately.  HB 1/6 bilaterally.  Right ears examined under the microscope. Packing removed with suction. Some packing adhered to healing tissue of EAC. T-tube in place and patent.     Assessment and Plan:  Walter Dunn is a 57 year old male who presents for follow up s/p right tympanoplasty with cartilage backing and right myringotomy and t-tube placement 2023. He is healing well, and his T-tube is in place and patent. I was able to suction some of the packing today. I recommended that he continue Floxin ear drops and start steroid ear drops. He should also continue to keep the ear dry. He can follow up with me in 1 month.      Scribe Disclosure:  I, Leonel Parks, " am serving as a scribe to document services personally performed by Kristin Pete MD at this visit, based upon the provider's statements to me. All documentation has been reviewed by the aforementioned provider prior to being entered into the official medical record.      Again, thank you for allowing me to participate in the care of your patient.      Sincerely,    Kristin Pete MD

## 2023-02-09 NOTE — LETTER
"2023      RE: Walter Dunn  [address]  [facility] [address]         Otolaryngology Clinic      Name: Walter Dunn  MRN: [id number]  Age: 57 year old  : 2025      Chief Complaint:   Follow up    History of Present Illness:   Walter Dunn is a 57 year old male with a history of left canal wall down tympanomastoidectomy in  at [facility] for cholesteatoma, cholesterol granuloma, and right cholesteatoma s/p right tympanoplasty with cartilage backing and right myringotomy and t-tube placement 2023.    Today, he reports that his ear has been feeling okay. He thinks that most of the packing came out of the ear or dissolved. He had a few days where he felt his hearing was improved, but it now feels muffled.    Physical Exam:   /71   Pulse 80   Temp 98.2  F (36.8  C)   Ht 1.753 m (5' 9\")   Wt 131.1 kg (289 lb)   SpO2 94%   BMI 42.68 kg/m       Male in no acute distress.  Alert and answering questions appropriately.  HB 1/6 bilaterally.  Right ear examined under the microscope. Packing removed with suction. Some packing adhered to healing incision of EAC. T-tube in place and patent.     Assessment and Plan:  Walter Dunn is a 57 year old male who presents for follow up s/p right tympanoplasty with cartilage backing and right myringotomy and t-tube placement 2023. He is healing well, and his T-tube is in place and patent. I was able to suction some of the packing today. I recommended that he continue Floxin ear drops and start steroid ear drops. He should also continue to keep the ear dry. He can follow up with me in 1 month.      Scribe Disclosure:  I, Leonel Parks, am serving as a scribe to document services personally performed by Kristin Pete MD at this visit, based upon the provider's statements to me. All documentation has been reviewed by the aforementioned provider prior to being entered into the official medical record.    The documentation " recorded by the scribe accurately reflects the services I personally performed and the decisions made by me.        Kristin Pete MD

## 2023-03-01 NOTE — PROGRESS NOTES
"  Otolaryngology Clinic      Name: Walter Dunn  MRN: [id number]  Age: 57 year old  : 2025      Chief Complaint:   Follow up    History of Present Illness:   Walter Dunn is a 57 year old male with a history of left canal wall down tympanomastoidectomy in  at [facility] for cholesteatoma, cholesterol granuloma, and right cholesteatoma who presents for follow up s/p right tympanoplasty with cartilage backing and right myringotomy and t-tube placement 2023. At our last visit on 23 he was doing well with some muffled hearing so was recommended to continue Floxin drops and start steroid ear drops. Today he tells me that his ears are feeling well.     Physical Exam:   /77 (BP Location: Left arm, Patient Position: Sitting, Cuff Size: Adult Large)   Pulse 66   Temp 97.6  F (36.4  C)   Ht 1.753 m (5' 9\")   Wt 131.1 kg (289 lb)   SpO2 100%   BMI 42.68 kg/m       Male in no acute distress.  Alert and answering questions appropriately.  HB 1/6 bilaterally.  Right ear examined under the microscope.  Right: T-tube is patent and in good position. Cartilage graft in good position.  Area on posterior TM still healing.    Audiogram:  AUDIOGRAM: He underwent an audiogram today. This demonstrates: right normal downsloping to severe primarily conductive loss and left mild downsloping to severe primarily conductive loss, symmetric normal downsloping to mild sensorineural hearing loss.      Right: Speech reception threshold is 45 dB with 100% word recognition at 75 dB. Tympanogram type DNT   Left: Speech reception threshold is 40 dB with 100% word recognition at 75 dB. Tympanogram type DNT     Audiogram was independently reviewed and compared to his preoperative test. Relatively stable mid frequency hearing with an improvement in the lowest frequencies and decline in the high frequencies.    Assessment and Plan:  Walter Dunn is a 57 year old male who presents for follow up s/p right " tympanoplasty with cartilage backing and right myringotomy and t-tube placement 1/20/2023. I reviewed the results of his audiogram today which is fairly stable although this is surprising as I would have expected his hearing to worsen given the absence of his incusl. His exam today is well appearing with right t-tube patent. He should continue Floxin drops and dry ear precautions for 2 more weeks. We discussed his second surgery which will be scheduled for July 2023. He does have an area of his right TM which appears to be healing so I would like to recheck this in about 6-8 weeks.       Scribe Disclosure:  I, Sharron Mraie, am serving as a scribe to document services personally performed by Kristin Pete MD at this visit, based upon the provider's statements to me. All documentation has been reviewed by the aforementioned provider prior to being entered into the official medical record.    The documentation recorded by the scribe accurately reflects the services I personally performed and the decisions made by me.

## 2023-03-02 ENCOUNTER — OFFICE VISIT (OUTPATIENT)
Dept: AUDIOLOGY | Facility: CLINIC | Age: 58
End: 2023-03-02
Attending: OTOLARYNGOLOGY
Payer: COMMERCIAL

## 2023-03-02 ENCOUNTER — OFFICE VISIT (OUTPATIENT)
Dept: OTOLARYNGOLOGY | Facility: CLINIC | Age: 58
End: 2023-03-02
Payer: COMMERCIAL

## 2023-03-02 VITALS
TEMPERATURE: 97.6 F | HEIGHT: 69 IN | OXYGEN SATURATION: 100 % | DIASTOLIC BLOOD PRESSURE: 77 MMHG | HEART RATE: 66 BPM | BODY MASS INDEX: 42.8 KG/M2 | WEIGHT: 289 LBS | SYSTOLIC BLOOD PRESSURE: 136 MMHG

## 2023-03-02 DIAGNOSIS — H71.91 CHOLESTEATOMA OF RIGHT EAR: Primary | ICD-10-CM

## 2023-03-02 DIAGNOSIS — H90.0 CONDUCTIVE HEARING LOSS, BILATERAL: ICD-10-CM

## 2023-03-02 DIAGNOSIS — H92.11 OTORRHEA, RIGHT: ICD-10-CM

## 2023-03-02 DIAGNOSIS — H71.91 CHOLESTEATOMA OF RIGHT EAR: ICD-10-CM

## 2023-03-02 PROCEDURE — 92557 COMPREHENSIVE HEARING TEST: CPT | Performed by: AUDIOLOGIST

## 2023-03-02 PROCEDURE — 99024 POSTOP FOLLOW-UP VISIT: CPT | Performed by: OTOLARYNGOLOGY

## 2023-03-02 RX ORDER — CEFAZOLIN SODIUM IN 0.9 % NACL 3 G/100 ML
3 INTRAVENOUS SOLUTION, PIGGYBACK (ML) INTRAVENOUS SEE ADMIN INSTRUCTIONS
Status: CANCELLED | OUTPATIENT
Start: 2023-03-02

## 2023-03-02 RX ORDER — ACETAMINOPHEN 325 MG/1
975 TABLET ORAL ONCE
Status: CANCELLED | OUTPATIENT
Start: 2023-03-02 | End: 2023-03-02

## 2023-03-02 RX ORDER — CEFAZOLIN SODIUM IN 0.9 % NACL 3 G/100 ML
3 INTRAVENOUS SOLUTION, PIGGYBACK (ML) INTRAVENOUS
Status: CANCELLED | OUTPATIENT
Start: 2023-03-02

## 2023-03-02 RX ORDER — DEXAMETHASONE SODIUM PHOSPHATE 4 MG/ML
10 INJECTION, SOLUTION INTRA-ARTICULAR; INTRALESIONAL; INTRAMUSCULAR; INTRAVENOUS; SOFT TISSUE ONCE
Status: CANCELLED | OUTPATIENT
Start: 2023-03-02 | End: 2023-03-02

## 2023-03-02 ASSESSMENT — PAIN SCALES - GENERAL: PAINLEVEL: NO PAIN (0)

## 2023-03-02 NOTE — PATIENT INSTRUCTIONS
You were seen in the ENT Clinic today by Dr. Pete. If you have any questions or concerns after your appointment, please contact us (see below)      2.   Please return to clinic in 8 weeks, no audiogram needed.         How to Contact Us:  Send a [employer] message to your provider. Our team will respond to you via [employer]. Occasionally, we will need to call you to get further information.  For urgent matters (Monday-Friday), call the ENT Clinic: [phone] and speak with a call center team member - they will route your call appropriately.   If you'd like to speak directly with a nurse, please find our contact information below. We do our best to check voicemail frequently throughout the day, and will work to call you back within 1-2 days. For urgent matters, please use the general clinic phone numbers listed above.      Christine HEAD RN  ENT RN Care Coordinator  Direct: [phone]    Karo STAPLES LPN  Direct: [phone]

## 2023-03-02 NOTE — LETTER
"3/2/2023      RE: Walter Dunn  [address]  [facility] [address]         Otolaryngology Clinic      Name: Walter Dunn  MRN: [id number]  Age: 57 year old  : 2023      Chief Complaint:   Follow up    History of Present Illness:   Walter Dunn is a 57 year old male with a history of left canal wall down tympanomastoidectomy in  at [facility] for cholesteatoma, cholesterol granuloma, and right cholesteatoma who presents for follow up s/p right tympanoplasty with cartilage backing and right myringotomy and t-tube placement 2023. At our last visit on 23 he was doing well with some muffled hearing so was recommended to continue Floxin drops and start steroid ear drops. Today he tells me that his ears are feeling well.     Physical Exam:   /77 (BP Location: Left arm, Patient Position: Sitting, Cuff Size: Adult Large)   Pulse 66   Temp 97.6  F (36.4  C)   Ht 1.753 m (5' 9\")   Wt 131.1 kg (289 lb)   SpO2 100%   BMI 42.68 kg/m       Male in no acute distress.  Alert and answering questions appropriately.  HB 1/6 bilaterally.  Right ear examined under the microscope.  Right: T-tube is patent and in good position. Cartilage graft in good position.  Area on posterior TM still healing.    Audiogram:  AUDIOGRAM: He underwent an audiogram today. This demonstrates: right normal downsloping to severe primarily conductive loss and left mild downsloping to severe primarily conductive loss, symmetric normal downsloping to mild sensorineural hearing loss.      Right: Speech reception threshold is 45 dB with 100% word recognition at 75 dB. Tympanogram type DNT   Left: Speech reception threshold is 40 dB with 100% word recognition at 75 dB. Tympanogram type DNT     Audiogram was independently reviewed and compared to his preoperative test. Relatively stable mid frequency hearing with an improvement in the lowest frequencies and decline in the high frequencies.    Assessment and " Plan:  Walter Dunn is a 57 year old male who presents for follow up s/p right tympanoplasty with cartilage backing and right myringotomy and t-tube placement 1/20/2023. I reviewed the results of his audiogram today which is fairly stable although this is surprising as I would have expected his hearing to worsen given the absence of his incusl. His exam today is well appearing with right t-tube patent. He should continue Floxin drops and dry ear precautions for 2 more weeks. We discussed his second surgery which will be scheduled for July 2023. He does have an area of his right TM which appears to be healing so I would like to recheck this in about 6-8 weeks.       Scribe Disclosure:  I, Sharron Marie, am serving as a scribe to document services personally performed by Kristin Pete MD at this visit, based upon the provider's statements to me. All documentation has been reviewed by the aforementioned provider prior to being entered into the official medical record.    The documentation recorded by the scribe accurately reflects the services I personally performed and the decisions made by me.        Kristin Pete MD

## 2023-03-02 NOTE — Clinical Note
"3/2/2023       RE: Walter Dunn  [address]  [facility] [address]     Dear Colleague,    Thank you for referring your patient, Walter Dunn, to the [facility] [place] [facility]. Please see a copy of my visit note below.      Otolaryngology Clinic      Name: Walter Dunn  MRN: [id number]  Age: 57 year old  : [birth year]23      Chief Complaint:   Follow up    History of Present Illness:   Walter Dunn is a 57 year old male with a history of left canal wall down tympanomastoidectomy in  at [facility] for cholesteatoma, cholesterol granuloma, and right cholesteatoma who presents for follow up s/p right tympanoplasty with cartilage backing and right myringotomy and t-tube placement 2023. At our last visit on 23 he was doing well with some muffled hearing so was recommended to continue Floxin drops and start steroid ear drops. Today he tells me that his ears are feeling well.     Physical Exam:   /77 (BP Location: Left arm, Patient Position: Sitting, Cuff Size: Adult Large)   Pulse 66   Temp 97.6  F (36.4  C)   Ht 1.753 m (5' 9\")   Wt 131.1 kg (289 lb)   SpO2 100%   BMI 42.68 kg/m       Physical examination:  Male in no acute distress.  Alert and answering questions appropriately.  HB 1/6 bilaterally.  Right ear examined under the microscope.  Right: T-tube is patent and in good position. Cartilage graft in good position.  Area on posterior TM still healing.    Audiogram:  AUDIOGRAM: He underwent an audiogram today. This demonstrates:      Right: Speech reception threshold is 45 dB with 100% word recognition at 75 dB. Tympanogram type DNT   Left: Speech reception threshold is 40 dB with 100% word recognition at 75 dB. Tympanogram type DNT     Audiogram was independently reviewed.    Assessment and Plan:  Walter Dunn is a 57 year old male who presents for follow up s/p right " tympanoplasty with cartilage backing and right myringotomy and t-tube placement 1/20/2023. I reviewed the results of his audiogram today which is stable although this is surprising as I would have expected his hearing to worsen some following surgery so suspect he has a fair amount of scar forming. His exam today is well appearing with right t-tube patent. He should continue Floxin drops and dry ear precautions for 2 more weeks. We discussed his second surgery which will be scheduled for July 2023. He does have an area of his right TM which appears to be healing so I would like to recheck this in about 6-8 weeks.       Scribe Disclosure:  I, Sharron Marie, am serving as a scribe to document services personally performed by Kristin Pete MD at this visit, based upon the provider's statements to me. All documentation has been reviewed by the aforementioned provider prior to being entered into the official medical record.      Again, thank you for allowing me to participate in the care of your patient.      Sincerely,    Kristin Pete MD

## 2023-03-02 NOTE — PROGRESS NOTES
AUDIOLOGY REPORT    SUMMARY: Audiology visit completed. See audiogram for results.      RECOMMENDATIONS: Follow-up with ENT.      Torrie Freitas  Audiologist  [place] License  #[id number]

## 2023-03-03 ENCOUNTER — TELEPHONE (OUTPATIENT)
Dept: OTOLARYNGOLOGY | Facility: CLINIC | Age: 58
End: 2023-03-03
Payer: COMMERCIAL

## 2023-03-03 NOTE — TELEPHONE ENCOUNTER
Called patient to schedule surgery with Dr. Pete in mid-late July. Patient wants to wait until may appointment with Dr. Pete to make a decision about surgery. Deferring order until then    Carmelita Cadena, Surgery Scheduler 3/3/2023 at 10:56 AM

## 2023-05-17 ENCOUNTER — TELEPHONE (OUTPATIENT)
Dept: OTOLARYNGOLOGY | Facility: CLINIC | Age: 58
End: 2023-05-17

## 2023-05-17 NOTE — TELEPHONE ENCOUNTER
Called patient to schedule surgery with Dr. Pete    Date of Surgery: 7/28    Location of surgery: [facility]    Pre-Op H&P: PCP on file    Pre/Post Imaging:  Not Applicable    Post-Op Appt Date: 3 weeks    Surgery Packet Mailed: yes    Additional comments: MYNOR Cadena on 5/17/2023 at 4:26 PM

## 2023-05-17 NOTE — TELEPHONE ENCOUNTER
Patient left a voicemail to schedule surgery with Dr. Juan R Cadean, Perioperative Coordinator, ENT 5/17/2023 at 10:48 AM

## 2023-05-31 ENCOUNTER — OFFICE VISIT (OUTPATIENT)
Dept: OTOLARYNGOLOGY | Facility: CLINIC | Age: 58
End: 2023-05-31
Payer: COMMERCIAL

## 2023-05-31 VITALS
OXYGEN SATURATION: 95 % | BODY MASS INDEX: 41.18 KG/M2 | WEIGHT: 278 LBS | HEIGHT: 69 IN | DIASTOLIC BLOOD PRESSURE: 73 MMHG | HEART RATE: 72 BPM | SYSTOLIC BLOOD PRESSURE: 123 MMHG | TEMPERATURE: 98.5 F

## 2023-05-31 DIAGNOSIS — H71.91 CHOLESTEATOMA OF RIGHT EAR: Primary | ICD-10-CM

## 2023-05-31 DIAGNOSIS — H69.93 DYSFUNCTION OF BOTH EUSTACHIAN TUBES: ICD-10-CM

## 2023-05-31 PROCEDURE — 99212 OFFICE O/P EST SF 10 MIN: CPT | Mod: 25 | Performed by: OTOLARYNGOLOGY

## 2023-05-31 PROCEDURE — 92504 EAR MICROSCOPY EXAMINATION: CPT | Performed by: OTOLARYNGOLOGY

## 2023-05-31 ASSESSMENT — PAIN SCALES - GENERAL: PAINLEVEL: NO PAIN (1)

## 2023-05-31 NOTE — PROGRESS NOTES
Walter Dunn is seen for a right ear check. He is s/p right tympanoplasty 1/20/23 and left canal wall down tympanomastoidectomy in 2013 for cholesteatoma. He is scheduled for a right revision tympanoplasty in July. He says he was only using drops once a week because they plug up his ear and decreased his hearing for a while. No pain or drainage, no changes in hearing.    Physical examination:  male in no acute distress.  Alert and answering questions appropriately.  HB 1/6 bilaterally.  Right ear examined under the microscope. Cerumen removed with an alligator, t-tube in position and open, cartilage backing in position, no retractions noted.    Assessment and plan:  Doing well. He no longer needs to use drops or follow dry ear precautions and we will see him in July.

## 2023-05-31 NOTE — NURSING NOTE
"Chief Complaint   Patient presents with     RECHECK     8 week follow up      Blood pressure 123/73, pulse 72, temperature 98.5  F (36.9  C), height 1.753 m (5' 9\"), weight 126.1 kg (278 lb), SpO2 95 %.  Olman Betts LPN    "

## 2023-05-31 NOTE — PATIENT INSTRUCTIONS
1. You were seen in the ENT Clinic today by Dr. Nissen.  If you have any questions or concerns after your appointment, please call   - Option 1: ENT Clinic: [phone]   - Option 2: Karo (Dr. Nissen's Nurse): [phone]                   Chirstine (Dr. Nissen's Nurse): [phone]      2.   Plan to return to clinic for your post-op appointments.       How to Contact Us:  Send a [employer] message to your provider. Our team will respond to you via [employer]. Occasionally, we will need to call you to get further information.  For urgent matters (Monday-Friday), call the ENT Clinic: [phone] and speak with a call center team member - they will route your call appropriately.   If you'd like to speak directly with a nurse, please find our contact information below. We do our best to check voicemail frequently throughout the day, and will work to call you back within 1-2 days. For urgent matters, please use the general clinic phone numbers listed above.       Karo MURPHY LPN  [facility] - Otolaryngology

## 2023-05-31 NOTE — LETTER
5/31/2023      RE: Walter Dunn  [address]  [place] [address]       Walter Dunn is seen for a right ear check. He is s/p right tympanoplasty 1/20/23 and left canal wall down tympanomastoidectomy in 2013 for cholesteatoma. He is scheduled for a right revision tympanoplasty in July. He says he was only using drops once a week because they plug up his ear and decreased his hearing for a while. No pain or drainage, no changes in hearing.    Physical examination:  male in no acute distress.  Alert and answering questions appropriately.  HB 1/6 bilaterally.  Right ear examined under the microscope. Cerumen removed with an alligator, t-tube in position and open, cartilage backing in position, no retractions noted.    Assessment and plan:  Doing well. He no longer needs to use drops or follow dry ear precautions and we will see him in July.      Kristin Pete MD

## 2023-07-17 ENCOUNTER — TRANSFERRED RECORDS (OUTPATIENT)
Dept: HEALTH INFORMATION MANAGEMENT | Facility: CLINIC | Age: 58
End: 2023-07-17
Payer: COMMERCIAL

## 2023-07-24 ENCOUNTER — TELEPHONE (OUTPATIENT)
Dept: OTOLARYNGOLOGY | Facility: CLINIC | Age: 58
End: 2023-07-24
Payer: COMMERCIAL

## 2023-07-24 NOTE — TELEPHONE ENCOUNTER
PAS Notification: Your patient is scheduled for surgery in 4 days. Critical chart components are missing. If the required components are not completed in EPIC by noon the day prior to surgery your case may be rescheduled. Thank you in advance for completing the record and improving [facility]'s quality of care.     Surgeon's Name:  DOMINGUEZ   Date of Surgery:    FRI 7/27   Procedure: REVISION RIGHT TYMPANOPLASTY WITH POSSIBLE CARTILAGE BACKING AND POSSIBLE OSSICULAR CHAIN RECONSTRUCTION - Right     Missing Components: H&P within 30 days         ASC Pre-procedure phone room   [phone]    [phone] fax

## 2023-07-24 NOTE — TELEPHONE ENCOUNTER
Called patient to confirm where he got his H&P. Patient stated that he got it from Sarah Weller DO  [phone]  and she said she would fax it over. He said he would follow up with her and we should too    Carmelita Cadena, Perioperative Coordinator, ENT 7/24/2023 at 4:29 PM

## 2023-07-25 ENCOUNTER — TELEPHONE (OUTPATIENT)
Dept: OTOLARYNGOLOGY | Facility: CLINIC | Age: 58
End: 2023-07-25
Payer: COMMERCIAL

## 2023-07-27 ENCOUNTER — ANESTHESIA EVENT (OUTPATIENT)
Dept: SURGERY | Facility: AMBULATORY SURGERY CENTER | Age: 58
End: 2023-07-27
Payer: COMMERCIAL

## 2023-07-27 ASSESSMENT — COPD QUESTIONNAIRES
CAT_SEVERITY: MILD
COPD: 1

## 2023-07-27 ASSESSMENT — LIFESTYLE VARIABLES: TOBACCO_USE: 1

## 2023-07-27 ASSESSMENT — ENCOUNTER SYMPTOMS: SEIZURES: 0

## 2023-07-27 NOTE — ANESTHESIA PREPROCEDURE EVALUATION
Anesthesia Pre-Procedure Evaluation    Patient: Walter Dunn   MRN: [id number] : 1965        Procedure : Procedure(s):  REVISION RIGHT TYMPANOPLASTY WITH POSSIBLE CARTILAGE BACKING AND POSSIBLE OSSICULAR CHAIN RECONSTRUCTION          Past Medical History:   Diagnosis Date    Broken nose     Hearing loss     Hypertension     Kidney stone     Nasal congestion     Sneezing     Sore throat     bother by uvula hanging too low and bothers him and makes him swallow    Tinnitus     Tobacco use     Weight gains       Past Surgical History:   Procedure Laterality Date    HERNIA REPAIR      x2    LASER DIODE TYMPANOMASTOIDECTOMY  2013    Procedure: LASER DIODE TYMPANOMASTOIDECTOMY;  Diode Laser Standby, Left Canal Wall Down Tympanomastoidectomy;  Surgeon: Kristin Pete MD;  Location: [facility] OR    LEG SURGERY      with screws and pins    MYRINGOTOMY, INSERT TUBE, COMBINED Right 2023    Procedure: COMBINED MYRINGOTOMY, INSERT TUBE RIGHT EAR;  Surgeon: Kristin Pete MD;  Location: [facility] OR    TYMPANOMASTOIDECTOMY WITH FACIAL MONITORING Right 2023    Procedure: RIGHT TYMPANOPLASTY, WITH FACIAL NERVE MONITORING, WITH CARTILAGE BACKING;  Surgeon: Kristin Pete MD;  Location: [facility] OR      Allergies   Allergen Reactions    Contrast Dye Shortness Of Breath    Vicodin [Hydrocodone-Acetaminophen] Nausea    Ace Inhibitors Cough    Oxycodone-Acetaminophen Anxiety     PERCOCET      Social History     Tobacco Use    Smoking status: Former     Types: Cigarettes     Quit date: 10/4/1982     Years since quittin.8    Smokeless tobacco: Former     Types: Chew    Tobacco comments:     Patient chew tobacco   Substance Use Topics    Alcohol use: No      Wt Readings from Last 1 Encounters:   23 126.1 kg (278 lb)        Anesthesia Evaluation   Pt has had prior anesthetic. Type: General.    No history of anesthetic complications       ROS/MED HX  ENT/Pulmonary: Comment: Cholesteatoma  Of note, he had a trache when he was  1 after swallowing a tack. Decannulated.    (+)     GELY risk factors, snores loudly, hypertension, obese,        tobacco use, Past use,       mild,  COPD,              Neurologic:    (-) no seizures and no CVA   Cardiovascular:     (+) Dyslipidemia hypertension- -   -  - -                                   (-) murmur   METS/Exercise Tolerance: >4 METS    Hematologic:  - neg hematologic  ROS     Musculoskeletal:       GI/Hepatic:  - neg GI/hepatic ROS     Renal/Genitourinary:     (+)       Nephrolithiasis ,       Endo:     (+)               Obesity,       Psychiatric/Substance Use:       Infectious Disease:       Malignancy:       Other:      (+)  , H/O Chronic Pain,         Physical Exam    Airway        Mallampati: II   TM distance: > 3 FB   Neck ROM: full   Mouth opening: > 3 cm    Respiratory Devices and Support         Dental     Comment: Teeth examined without any significant abnormality, patient denies loose, missing or chipped teeth or anything removable.         Cardiovascular          Rhythm and rate: regular and normal (-) no murmur    Pulmonary   pulmonary exam normal        breath sounds clear to auscultation           OUTSIDE LABS:  CBC: No results found for: WBC, HGB, HCT, PLT  BMP: No results found for: NA, POTASSIUM, CHLORIDE, CO2, BUN, CR, GLC  COAGS: No results found for: PTT, INR, FIBR  POC: No results found for: BGM, HCG, HCGS  HEPATIC: No results found for: ALBUMIN, PROTTOTAL, ALT, AST, GGT, ALKPHOS, BILITOTAL, BILIDIRECT, ERIBERTO  OTHER: No results found for: PH, LACT, A1C, SERGEY, PHOS, MAG, LIPASE, AMYLASE, TSH, T4, T3, CRP, SED    Anesthesia Plan    ASA Status:  3    NPO Status:  NPO Appropriate    Anesthesia Type: General.     - Airway: ETT   Induction: Intravenous, Propofol.   Maintenance: Balanced.   Techniques and Equipment:     - Airway: Video-Laryngoscope       Consents    Anesthesia Plan(s) and associated risks, benefits, and realistic alternatives discussed. Questions answered and  patient/representative(s) expressed understanding.     - Discussed:     - Discussed with:  Patient      - Extended Intubation/Ventilatory Support Discussed: No.      - Patient is DNR/DNI Status: No     Use of blood products discussed: No .     Postoperative Care    Pain management: IV analgesics, Oral pain medications, Multi-modal analgesia.   PONV prophylaxis: Ondansetron (or other 5HT-3), Dexamethasone or Solumedrol, Background Propofol Infusion     Comments:    Other Comments: Discussed plan for general anesthetic with Oral ETT. Discussed risks of sore throat, post op pain/nausea, oropharyngeal damage, rare major complications.         H&P reviewed: Unable to attach H&P to encounter due to EHR limitations. H&P Update: appropriate H&P reviewed, patient examined. No interval changes since H&P (within 30 days).         Leonel Emery MD

## 2023-07-28 ENCOUNTER — HOSPITAL ENCOUNTER (OUTPATIENT)
Facility: AMBULATORY SURGERY CENTER | Age: 58
Discharge: HOME | End: 2023-07-28
Attending: OTOLARYNGOLOGY
Payer: COMMERCIAL

## 2023-07-28 ENCOUNTER — ANESTHESIA (OUTPATIENT)
Dept: SURGERY | Facility: AMBULATORY SURGERY CENTER | Age: 58
End: 2023-07-28
Payer: COMMERCIAL

## 2023-07-28 VITALS
DIASTOLIC BLOOD PRESSURE: 82 MMHG | SYSTOLIC BLOOD PRESSURE: 127 MMHG | TEMPERATURE: 98 F | OXYGEN SATURATION: 94 % | WEIGHT: 278 LBS | HEIGHT: 69 IN | HEART RATE: 68 BPM | RESPIRATION RATE: 14 BRPM | BODY MASS INDEX: 41.18 KG/M2

## 2023-07-28 DIAGNOSIS — H71.91 CHOLESTEATOMA OF RIGHT EAR: ICD-10-CM

## 2023-07-28 DIAGNOSIS — G89.18 POSTOPERATIVE PAIN: Primary | ICD-10-CM

## 2023-07-28 PROCEDURE — 69440 EXPLORATION OF MIDDLE EAR: CPT | Mod: RT

## 2023-07-28 PROCEDURE — 69440 EXPLORATION OF MIDDLE EAR: CPT | Mod: RT | Performed by: OTOLARYNGOLOGY

## 2023-07-28 RX ORDER — HYDROCODONE BITARTRATE AND ACETAMINOPHEN 5; 325 MG/1; MG/1
1 TABLET ORAL
Status: DISCONTINUED | OUTPATIENT
Start: 2023-07-28 | End: 2023-07-29 | Stop reason: HOSPADM

## 2023-07-28 RX ORDER — ONDANSETRON 4 MG/1
4 TABLET, ORALLY DISINTEGRATING ORAL EVERY 30 MIN PRN
Status: DISCONTINUED | OUTPATIENT
Start: 2023-07-28 | End: 2023-07-29 | Stop reason: HOSPADM

## 2023-07-28 RX ORDER — OFLOXACIN 3 MG/ML
5 SOLUTION AURICULAR (OTIC) 2 TIMES DAILY
Qty: 10 ML | Refills: 0 | Status: SHIPPED | OUTPATIENT
Start: 2023-08-04 | End: 2023-08-25

## 2023-07-28 RX ORDER — DEXAMETHASONE SODIUM PHOSPHATE 4 MG/ML
INJECTION, SOLUTION INTRA-ARTICULAR; INTRALESIONAL; INTRAMUSCULAR; INTRAVENOUS; SOFT TISSUE PRN
Status: DISCONTINUED | OUTPATIENT
Start: 2023-07-28 | End: 2023-07-28

## 2023-07-28 RX ORDER — PROPOFOL 10 MG/ML
INJECTION, EMULSION INTRAVENOUS CONTINUOUS PRN
Status: DISCONTINUED | OUTPATIENT
Start: 2023-07-28 | End: 2023-07-28

## 2023-07-28 RX ORDER — ALBUTEROL SULFATE 0.83 MG/ML
2.5 SOLUTION RESPIRATORY (INHALATION) EVERY 4 HOURS PRN
Status: DISCONTINUED | OUTPATIENT
Start: 2023-07-28 | End: 2023-07-28 | Stop reason: HOSPADM

## 2023-07-28 RX ORDER — LORAZEPAM 2 MG/ML
.5-1 INJECTION INTRAMUSCULAR
Status: DISCONTINUED | OUTPATIENT
Start: 2023-07-28 | End: 2023-07-28 | Stop reason: HOSPADM

## 2023-07-28 RX ORDER — DEXAMETHASONE SODIUM PHOSPHATE 10 MG/ML
10 INJECTION, SOLUTION INTRAMUSCULAR; INTRAVENOUS ONCE
Status: DISCONTINUED | OUTPATIENT
Start: 2023-07-28 | End: 2023-07-28 | Stop reason: HOSPADM

## 2023-07-28 RX ORDER — FENTANYL CITRATE 50 UG/ML
25 INJECTION, SOLUTION INTRAMUSCULAR; INTRAVENOUS
Status: DISCONTINUED | OUTPATIENT
Start: 2023-07-28 | End: 2023-07-29 | Stop reason: HOSPADM

## 2023-07-28 RX ORDER — PROPOFOL 10 MG/ML
INJECTION, EMULSION INTRAVENOUS PRN
Status: DISCONTINUED | OUTPATIENT
Start: 2023-07-28 | End: 2023-07-28

## 2023-07-28 RX ORDER — HYDROXYZINE HYDROCHLORIDE 25 MG/1
25 TABLET, FILM COATED ORAL EVERY 6 HOURS PRN
Status: DISCONTINUED | OUTPATIENT
Start: 2023-07-28 | End: 2023-07-28 | Stop reason: HOSPADM

## 2023-07-28 RX ORDER — LIDOCAINE 40 MG/G
CREAM TOPICAL
Status: DISCONTINUED | OUTPATIENT
Start: 2023-07-28 | End: 2023-07-28 | Stop reason: HOSPADM

## 2023-07-28 RX ORDER — ACETAMINOPHEN 325 MG/1
650 TABLET ORAL
Status: DISCONTINUED | OUTPATIENT
Start: 2023-07-28 | End: 2023-07-29 | Stop reason: HOSPADM

## 2023-07-28 RX ORDER — DIMENHYDRINATE 50 MG/ML
25 INJECTION, SOLUTION INTRAMUSCULAR; INTRAVENOUS
Status: DISCONTINUED | OUTPATIENT
Start: 2023-07-28 | End: 2023-07-28 | Stop reason: HOSPADM

## 2023-07-28 RX ORDER — HYDROMORPHONE HYDROCHLORIDE 1 MG/ML
0.2 INJECTION, SOLUTION INTRAMUSCULAR; INTRAVENOUS; SUBCUTANEOUS EVERY 5 MIN PRN
Status: DISCONTINUED | OUTPATIENT
Start: 2023-07-28 | End: 2023-07-28 | Stop reason: HOSPADM

## 2023-07-28 RX ORDER — FENTANYL CITRATE 50 UG/ML
50 INJECTION, SOLUTION INTRAMUSCULAR; INTRAVENOUS EVERY 5 MIN PRN
Status: DISCONTINUED | OUTPATIENT
Start: 2023-07-28 | End: 2023-07-28 | Stop reason: HOSPADM

## 2023-07-28 RX ORDER — MEPERIDINE HYDROCHLORIDE 25 MG/ML
12.5 INJECTION INTRAMUSCULAR; INTRAVENOUS; SUBCUTANEOUS EVERY 5 MIN PRN
Status: DISCONTINUED | OUTPATIENT
Start: 2023-07-28 | End: 2023-07-28 | Stop reason: HOSPADM

## 2023-07-28 RX ORDER — FENTANYL CITRATE 50 UG/ML
25 INJECTION, SOLUTION INTRAMUSCULAR; INTRAVENOUS EVERY 5 MIN PRN
Status: DISCONTINUED | OUTPATIENT
Start: 2023-07-28 | End: 2023-07-28 | Stop reason: HOSPADM

## 2023-07-28 RX ORDER — HYDROMORPHONE HYDROCHLORIDE 1 MG/ML
0.4 INJECTION, SOLUTION INTRAMUSCULAR; INTRAVENOUS; SUBCUTANEOUS EVERY 5 MIN PRN
Status: DISCONTINUED | OUTPATIENT
Start: 2023-07-28 | End: 2023-07-28 | Stop reason: HOSPADM

## 2023-07-28 RX ORDER — LABETALOL HYDROCHLORIDE 5 MG/ML
10 INJECTION, SOLUTION INTRAVENOUS
Status: DISCONTINUED | OUTPATIENT
Start: 2023-07-28 | End: 2023-07-28 | Stop reason: HOSPADM

## 2023-07-28 RX ORDER — SODIUM CHLORIDE, SODIUM LACTATE, POTASSIUM CHLORIDE, CALCIUM CHLORIDE 600; 310; 30; 20 MG/100ML; MG/100ML; MG/100ML; MG/100ML
INJECTION, SOLUTION INTRAVENOUS CONTINUOUS
Status: DISCONTINUED | OUTPATIENT
Start: 2023-07-28 | End: 2023-07-28 | Stop reason: HOSPADM

## 2023-07-28 RX ORDER — ONDANSETRON 4 MG/1
4 TABLET, ORALLY DISINTEGRATING ORAL EVERY 30 MIN PRN
Status: DISCONTINUED | OUTPATIENT
Start: 2023-07-28 | End: 2023-07-28 | Stop reason: HOSPADM

## 2023-07-28 RX ORDER — ACETAMINOPHEN 325 MG/1
975 TABLET ORAL ONCE
Status: DISCONTINUED | OUTPATIENT
Start: 2023-07-28 | End: 2023-07-28 | Stop reason: HOSPADM

## 2023-07-28 RX ORDER — ONDANSETRON 2 MG/ML
INJECTION INTRAMUSCULAR; INTRAVENOUS PRN
Status: DISCONTINUED | OUTPATIENT
Start: 2023-07-28 | End: 2023-07-28

## 2023-07-28 RX ORDER — ACETAMINOPHEN 325 MG/1
975 TABLET ORAL ONCE
Status: DISCONTINUED | OUTPATIENT
Start: 2023-07-28 | End: 2023-07-29 | Stop reason: HOSPADM

## 2023-07-28 RX ORDER — HYDRALAZINE HYDROCHLORIDE 20 MG/ML
2.5-5 INJECTION INTRAMUSCULAR; INTRAVENOUS EVERY 10 MIN PRN
Status: DISCONTINUED | OUTPATIENT
Start: 2023-07-28 | End: 2023-07-28 | Stop reason: HOSPADM

## 2023-07-28 RX ORDER — CEFAZOLIN SODIUM IN 0.9 % NACL 3 G/100 ML
3 INTRAVENOUS SOLUTION, PIGGYBACK (ML) INTRAVENOUS SEE ADMIN INSTRUCTIONS
Status: DISCONTINUED | OUTPATIENT
Start: 2023-07-28 | End: 2023-07-28 | Stop reason: HOSPADM

## 2023-07-28 RX ORDER — ONDANSETRON 2 MG/ML
4 INJECTION INTRAMUSCULAR; INTRAVENOUS EVERY 30 MIN PRN
Status: DISCONTINUED | OUTPATIENT
Start: 2023-07-28 | End: 2023-07-28 | Stop reason: HOSPADM

## 2023-07-28 RX ORDER — DEXAMETHASONE SODIUM PHOSPHATE 10 MG/ML
4 INJECTION, SOLUTION INTRAMUSCULAR; INTRAVENOUS
Status: DISCONTINUED | OUTPATIENT
Start: 2023-07-28 | End: 2023-07-28 | Stop reason: HOSPADM

## 2023-07-28 RX ORDER — OXYCODONE HYDROCHLORIDE 5 MG/1
10 TABLET ORAL
Status: DISCONTINUED | OUTPATIENT
Start: 2023-07-28 | End: 2023-07-29 | Stop reason: HOSPADM

## 2023-07-28 RX ORDER — ONDANSETRON 2 MG/ML
4 INJECTION INTRAMUSCULAR; INTRAVENOUS EVERY 30 MIN PRN
Status: DISCONTINUED | OUTPATIENT
Start: 2023-07-28 | End: 2023-07-29 | Stop reason: HOSPADM

## 2023-07-28 RX ORDER — EPHEDRINE SULFATE 50 MG/ML
INJECTION, SOLUTION INTRAMUSCULAR; INTRAVENOUS; SUBCUTANEOUS PRN
Status: DISCONTINUED | OUTPATIENT
Start: 2023-07-28 | End: 2023-07-28

## 2023-07-28 RX ORDER — OXYCODONE HYDROCHLORIDE 5 MG/1
5 TABLET ORAL EVERY 6 HOURS PRN
Qty: 10 TABLET | Refills: 0 | Status: SHIPPED | OUTPATIENT
Start: 2023-07-28 | End: 2023-09-21

## 2023-07-28 RX ORDER — LIDOCAINE HYDROCHLORIDE 20 MG/ML
INJECTION, SOLUTION INFILTRATION; PERINEURAL PRN
Status: DISCONTINUED | OUTPATIENT
Start: 2023-07-28 | End: 2023-07-28

## 2023-07-28 RX ORDER — ACETAMINOPHEN 325 MG/1
975 TABLET ORAL ONCE
Status: COMPLETED | OUTPATIENT
Start: 2023-07-28 | End: 2023-07-28

## 2023-07-28 RX ORDER — OXYCODONE HYDROCHLORIDE 5 MG/1
5 TABLET ORAL
Status: DISCONTINUED | OUTPATIENT
Start: 2023-07-28 | End: 2023-07-29 | Stop reason: HOSPADM

## 2023-07-28 RX ORDER — KETOROLAC TROMETHAMINE 30 MG/ML
15 INJECTION, SOLUTION INTRAMUSCULAR; INTRAVENOUS
Status: COMPLETED | OUTPATIENT
Start: 2023-07-28 | End: 2023-07-28

## 2023-07-28 RX ORDER — BACITRACIN 500 [USP'U]/G
OINTMENT OPHTHALMIC PRN
Status: DISCONTINUED | OUTPATIENT
Start: 2023-07-28 | End: 2023-07-28 | Stop reason: HOSPADM

## 2023-07-28 RX ORDER — HALOPERIDOL 5 MG/ML
1 INJECTION INTRAMUSCULAR
Status: DISCONTINUED | OUTPATIENT
Start: 2023-07-28 | End: 2023-07-28 | Stop reason: HOSPADM

## 2023-07-28 RX ORDER — CEFAZOLIN SODIUM IN 0.9 % NACL 3 G/100 ML
3 INTRAVENOUS SOLUTION, PIGGYBACK (ML) INTRAVENOUS
Status: COMPLETED | OUTPATIENT
Start: 2023-07-28 | End: 2023-07-28

## 2023-07-28 RX ADMIN — Medication 0.5 MG: at 13:08

## 2023-07-28 RX ADMIN — ONDANSETRON 4 MG: 2 INJECTION INTRAMUSCULAR; INTRAVENOUS at 12:24

## 2023-07-28 RX ADMIN — LIDOCAINE HYDROCHLORIDE 100 MG: 20 INJECTION, SOLUTION INFILTRATION; PERINEURAL at 12:03

## 2023-07-28 RX ADMIN — KETOROLAC TROMETHAMINE 15 MG: 30 INJECTION, SOLUTION INTRAMUSCULAR; INTRAVENOUS at 13:31

## 2023-07-28 RX ADMIN — Medication 100 MCG: at 12:22

## 2023-07-28 RX ADMIN — ACETAMINOPHEN 975 MG: 325 TABLET ORAL at 09:59

## 2023-07-28 RX ADMIN — EPHEDRINE SULFATE 10 MG: 50 INJECTION, SOLUTION INTRAMUSCULAR; INTRAVENOUS; SUBCUTANEOUS at 12:24

## 2023-07-28 RX ADMIN — EPHEDRINE SULFATE 10 MG: 50 INJECTION, SOLUTION INTRAMUSCULAR; INTRAVENOUS; SUBCUTANEOUS at 12:28

## 2023-07-28 RX ADMIN — DEXAMETHASONE SODIUM PHOSPHATE 4 MG: 4 INJECTION, SOLUTION INTRA-ARTICULAR; INTRALESIONAL; INTRAMUSCULAR; INTRAVENOUS; SOFT TISSUE at 12:24

## 2023-07-28 RX ADMIN — PROPOFOL 200 MG: 10 INJECTION, EMULSION INTRAVENOUS at 12:03

## 2023-07-28 RX ADMIN — SODIUM CHLORIDE, SODIUM LACTATE, POTASSIUM CHLORIDE, CALCIUM CHLORIDE: 600; 310; 30; 20 INJECTION, SOLUTION INTRAVENOUS at 11:56

## 2023-07-28 RX ADMIN — Medication 3 G: at 12:02

## 2023-07-28 RX ADMIN — PROPOFOL 150 MCG/KG/MIN: 10 INJECTION, EMULSION INTRAVENOUS at 12:03

## 2023-07-28 NOTE — BRIEF OP NOTE
[facility] Brief Operative Note    Pre-operative diagnosis: Cholesteatoma of right ear [H71.91]   Post-operative diagnosis Right middle ear scar tissue    Procedure: Procedure(s):  right tympanotomy and middle ear exploration   Surgeon(s): Surgeon(s) and Role:     * Kristin Pete MD - Primary     * Christine Tan MD - Resident - Assisting     * Jessica Leung MD - Fellow - Assisting   Estimated blood loss: 5mL   Specimens: * No specimens in log *   Findings: Right middle ear scar tissue from the tympanic membrane to the oval window   No evidence of recurrent cholesteatoma

## 2023-07-28 NOTE — ANESTHESIA CARE TRANSFER NOTE
Patient: Walter Dunn    Procedure: Procedure(s):  right tympanotomy and middle ear exploration       Diagnosis: Cholesteatoma of right ear [H71.91]  Diagnosis Additional Information: No value filed.    Anesthesia Type:   General     Note:    Oropharynx: spontaneously breathing and oropharynx clear of all foreign objects  Level of Consciousness: drowsy  Oxygen Supplementation: face mask  Level of Supplemental Oxygen (L/min / FiO2): 4  Independent Airway: airway patency satisfactory and stable  Dentition: dentition unchanged  Vital Signs Stable: post-procedure vital signs reviewed and stable  Report to RN Given: handoff report given  Patient transferred to: PACU  Comments: Resps easy and regular. Report to PACU RN  Handoff Report: Identifed the Patient, Identified the Reponsible Provider, Reviewed the pertinent medical history, Discussed the surgical course, Reviewed Intra-OP anesthesia mangement and issues during anesthesia, Set expectations for post-procedure period and Allowed opportunity for questions and acknowledgement of understanding      Vitals:  Vitals Value Taken Time   /68 07/28/23 1315   Temp 98    Pulse 73 07/28/23 1317   Resp 10 07/28/23 1317   SpO2 97 % 07/28/23 1317   Vitals shown include unvalidated device data.    Electronically Signed By: ISI PATEL CRNA  July 28, 2023  1:18 PM

## 2023-07-28 NOTE — ANESTHESIA PROCEDURE NOTES
Airway       Patient location during procedure: OR       Procedure Start/Stop Times: 7/28/2023 12:05 PM  Staff -        Performed By: CRNA  Consent for Airway        Urgency: elective  Indications and Patient Condition       Indications for airway management: azam-procedural         Mask difficulty assessment: 0 - not attempted    Final Airway Details       Final airway type: endotracheal airway       Successful airway: ETT - single  Endotracheal Airway Details        ETT size (mm): 7.5       Cuffed: yes       Successful intubation technique: video laryngoscopy       VL Blade Size: MAC 4       Grade View of Cords: 1       Adjucts: stylet       Position: Right       Measured from: lips       Secured at (cm): 24       Bite block used: None    Post intubation assessment        Placement verified by: capnometry, equal breath sounds and chest rise        Number of attempts at approach: 1       Secured with: pink tape       Ease of procedure: easy       Dentition: Intact and Unchanged    Medication(s) Administered   Medication Administration Time: 7/28/2023 12:05 PM

## 2023-07-28 NOTE — DISCHARGE INSTRUCTIONS
"1. Resume your home medications. Take pain medication, Tylenol or ibuprofen as needed. Use docusate to avoid constipation. Start your ear drops in 1 week after surgery and use until your follow up.    2. Wound care  * Change the cotton ball in your ear as needed for drainage.  It's normal to expect some drainage from your ear for the first few days after surgery.    3. Use a cotton ball coated with vasoline to keep water out of ear canal.    4. For the next week, no heavy lifting more than 5 pounds, no strenuous activities. No nose blowing. Sneeze with your mouth open.    5. Please call MD or come to the ED for shortness of breath, trouble breathing, inability to tolerate liquids, intractable dizziness, or signs of infection such as fevers or redness.      Call the [phone] clinic number if you have any questions and concerns during the day and call [phone] at night and ask for \"ENT resident on call\".     6. Follow up with Dr. Pete as previously scheduled.    [facility]  Home Care Following Anesthesia  For 24 hours after surgery:  Get plenty of rest.  A responsible adult must stay with you for at least 24 hours after you leave the surgery center.  Do not drive or use heavy equipment.  If you have weakness or tingling, don't drive or use heavy equipment until this feeling goes away.   Do not drink alcohol.   Avoid strenuous or risky activities.  Ask for help when climbing stairs.  You may feel lightheaded.  IF so, sit for a few minutes before standing.  Have someone help you get up.   If you have nausea (feel sick to your stomach): Drink only clear liquids such as apple juice, ginger ale, broth or 7-Up.  Rest may also help.  Be sure to drink enough fluids.  Move to a regular diet as you feel able.   You may have a slight fever.  Call the doctor if your fever is over 100 F (37.7 C) (taken under the tongue) or lasts longer than 24 hours.  You may have a dry mouth, a sore " throat, muscle aches or trouble sleeping. These should go away after 24 hours.  Do not make important or legal decisions.   It is recommended to avoid smoking.               Tips for taking pain medications  To get the best pain relief possible, remember these points:  Take pain medications as directed, before pain becomes severe.  Pain medication can upset your stomach: taking it with food may help.  Constipation is a common side effect of pain medication. Drink plenty of  fluids.  Eat foods high in fiber. Take a stool softener if recommended by your doctor or pharmacist.  Do not drink alcohol, drive or operate machinery while taking pain medications.  Ask about other ways to control pain, such as with heat, ice or relaxation.    Tylenol/Acetaminophen Consumption    If you feel your pain relief is insufficient, you may take Tylenol/Acetaminophen in addition to your narcotic pain medication.   Be careful not to exceed 4,000 mg of Tylenol/Acetaminophen in a 24 hour period from all sources.  If you are taking extra strength Tylenol/acetaminophen (500 mg), the maximum dose is 8 tablets in 24 hours.  If you are taking regular strength acetaminophen (325 mg), the maximum dose is 12 tablets in 24 hours.  You were given 975 mg of Tylenol at 10:00 am, you may take Tylenol again at 4:00 pm.     Call a doctor for any of the following:  Signs of infection (fever, growing tenderness at the surgery site, a large amount of drainage or bleeding, severe pain, foul-smelling drainage, redness, swelling).  It has been over 8 to 10 hours since surgery and you are still not able to urinate (pass water).  Headache for over 24 hours.  Numbness, tingling or weakness the day after surgery (if you had spinal anesthesia).  Signs of Covid-19 infection (temperature over 100 degrees, shortness of breath, cough, loss of taste/smell, generalized body aches, persistent headache, chills, sore throat, nausea/vomiting/diarrhea)  Your doctor is:    Kristin Pete, ENT Otolaryngology: [phone]                    Or dial [phone] and ask for the resident on call for:  ENT Otolaryngology  For emergency care, call the:  [facility]:  [phone] (TTY for hearing impaired: [phone])

## 2023-07-28 NOTE — ANESTHESIA POSTPROCEDURE EVALUATION
Patient: Walter Dunn    Procedure: Procedure(s):  right tympanotomy and middle ear exploration       Anesthesia Type:  General    Note:  Disposition: Outpatient   Postop Pain Control: Uneventful            Sign Out: Well controlled pain   PONV: No   Neuro/Psych: Uneventful            Sign Out: Acceptable/Baseline neuro status   Airway/Respiratory: Uneventful            Sign Out: Acceptable/Baseline resp. status   CV/Hemodynamics: Uneventful            Sign Out: Acceptable CV status; No obvious hypovolemia; No obvious fluid overload   Other NRE:    DID A NON-ROUTINE EVENT OCCUR? No           Last vitals:  Vitals Value Taken Time   /77 07/28/23 1330   Temp 36.2  C (97.2  F) 07/28/23 1311   Pulse 66 07/28/23 1333   Resp 2 07/28/23 1333   SpO2 98 % 07/28/23 1338   Vitals shown include unvalidated device data.    Electronically Signed By: Leonel Emery MD  July 28, 2023  2:35 PM

## 2023-07-31 NOTE — OP NOTE
Date of service:  7/28/23    Preoperative diagnosis:  cholesteatoma and morbid obesity (BMI 41.05)    Postoperative diagnosis:  conductive hearing loss and morbid obesity  (BMI 41.05)    Procedure:  1.  Right tympanotomy with middle ear exploration  2.  Facial nerve monitoring x 1 hour    Surgeon:  Kristin Pete MD    Fellow:  Jessica Leung MD    Resident:  Christine Tan MD    Anesthesia:  General    EBL:  5cc    Specimens:  None    Complications:  None    Findings:  Clean middle ear without cholesteatoma, stapes intact and slightly hypermobile.    Indications:  Walter Dunn is a patient with a right cholesteatoma s/p tympanoplasty.  Discussion was had about revision tympanoplasty.  Risks and benefits had been discussed and consent had been obtained.    Procedure:  The patient was taken to the operating room and placed supine on the operating room table.  General anesthesia was induced and endotracheal intubation was performed.  Time Out was then performed with confirmation of the patient, site and procedure.  Facial nerve electrodes were placed on the right side of the face.  Impedances were checked and the nerve was monitored for the entirety of the case.  1:100,000 epinephrine was injected into the ear canal and tragus.  The area was prepped and draped in standard surgical fashion.  The operating microscope was brought into position and used for the entirety of the case.  The ear canal was inspected and irrigated with normal saline.  The tympanic membrane was inspected.  There is a t-tube in position in the anterior quadrant which is open. TM intact with no retraction pockets. Canal incisions were made and the tympanomeatal flap was elevated. Access to the ear is difficult due to the patient's body habitus. The middle ear was entered inferiorly. The hypotympanum had some mucosal bands which were lysed. It is clear. As the TM was elevated superiorly, there were noted to be thick scar bands in the  mesotympanum. The TM was then elevated superiorly and the epitympanum entered. The TM was elevated anteriorly. The epitympanum is noted to be clear. The scar bands were lysed in the mesotympanum and the stapes was identified. The footplate is intact with scar bands overlying it other than a space between two scar bands that the footplate can be visualized and palpated through. The small remnant of posterior crura is noted to be adhered to the fallopian canal. This was released partially from the fallopian canal but appeared to be disconnected from the footplate. Due to the patient's relatively small conductive hearing loss, the decision was made to not place a TORP. Gelfoam was used to fill the middle ear including over the footplate.  The tympanomeatal flap was placed back into position and gelfoam used to seal the incision.  Bacitracin was used to fill the ear canal.  A cotton ball was placed over the ear canal and the facial nerve electrodes removed.  The patient tolerated the procedure well and counts were correct.  The patient was returned to Anesthesia, awakened, extubated and taken to the PACU in stable condition.

## 2023-08-17 ENCOUNTER — OFFICE VISIT (OUTPATIENT)
Dept: OTOLARYNGOLOGY | Facility: CLINIC | Age: 58
End: 2023-08-17
Payer: COMMERCIAL

## 2023-08-17 VITALS
DIASTOLIC BLOOD PRESSURE: 78 MMHG | HEIGHT: 69 IN | TEMPERATURE: 97.7 F | SYSTOLIC BLOOD PRESSURE: 132 MMHG | WEIGHT: 280 LBS | OXYGEN SATURATION: 96 % | BODY MASS INDEX: 41.47 KG/M2 | HEART RATE: 74 BPM

## 2023-08-17 DIAGNOSIS — H90.0 CONDUCTIVE HEARING LOSS, BILATERAL: ICD-10-CM

## 2023-08-17 DIAGNOSIS — H69.93 DYSFUNCTION OF BOTH EUSTACHIAN TUBES: Primary | ICD-10-CM

## 2023-08-17 PROCEDURE — 99024 POSTOP FOLLOW-UP VISIT: CPT | Performed by: OTOLARYNGOLOGY

## 2023-08-17 ASSESSMENT — PAIN SCALES - GENERAL: PAINLEVEL: NO PAIN (0)

## 2023-08-17 NOTE — NURSING NOTE
"Chief Complaint   Patient presents with    RECHECK     3 week post op    .  Blood pressure 132/78, pulse 74, temperature 97.7  F (36.5  C), height 1.753 m (5' 9\"), weight 127 kg (280 lb), SpO2 96 %.    Olman Betts LPN    "

## 2023-08-17 NOTE — PATIENT INSTRUCTIONS
1. You were seen in the ENT Clinic today by Dr. Pete.  If you have any questions or concerns after your appointment, please call   - Option 1: ENT Clinic: [phone]   - Option 2: Karo (Dr. Pete's Nurse): [phone]          Christine (Dr. Pete's Nurse): [phone]     2.   Plan to return to clinic around 1 month with hearing test    How to Contact Us:  Send a [employer] message to your provider. Our team will respond to you via [employer]. Occasionally, we will need to call you to get further information.  For urgent matters (Monday-Friday), call the ENT Clinic: [phone] and speak with a call center team member - they will route your call appropriately.   If you'd like to speak directly with a nurse, please find our contact information below. We do our best to check voicemail frequently throughout the day, and will work to call you back within 1-2 days. For urgent matters, please use the general clinic phone numbers listed above.        Karo MURPHY LPN  [facility] - Otolaryngology

## 2023-08-17 NOTE — PROGRESS NOTES
Walter Dunn is seen for his first postoperative visit after right tympanotomy with middle ear exploration. No cholesteatoma was identified. He does think is hearing is slightly down but not significantly so. No pain or drainage.    Physical examination:  male in no acute distress.  Alert and answering questions appropriately.  HB 1/6 bilaterally.  Right ear examined under the microscope. He has almost no packing in the ear remaining, very small area of granulation on the ear canal, t-tube in position and open, no retractions.    Assessment and plan:  Healing as expected. He will continue drops and dry ear precautions and we'll see him back in a month with an audiogram.

## 2023-08-17 NOTE — LETTER
8/17/2023      RE: Walter Dunn  [address] [place] [address]       Walter Dunn is seen for his first postoperative visit after right tympanotomy with middle ear exploration. No cholesteatoma was identified. He does think is hearing is slightly down but not significantly so. No pain or drainage.    Physical examination:  male in no acute distress.  Alert and answering questions appropriately.  HB 1/6 bilaterally.  Right ear examined under the microscope. He has almost no packing in the ear remaining, very small area of granulation on the ear canal, t-tube in position and open, no retractions.    Assessment and plan:  Healing as expected. He will continue drops and dry ear precautions and we'll see him back in a month with an audiogram.    Kristin Pete MD

## 2023-09-21 ENCOUNTER — OFFICE VISIT (OUTPATIENT)
Dept: AUDIOLOGY | Facility: CLINIC | Age: 58
End: 2023-09-21
Payer: COMMERCIAL

## 2023-09-21 ENCOUNTER — OFFICE VISIT (OUTPATIENT)
Dept: OTOLARYNGOLOGY | Facility: CLINIC | Age: 58
End: 2023-09-21
Payer: COMMERCIAL

## 2023-09-21 VITALS
SYSTOLIC BLOOD PRESSURE: 136 MMHG | DIASTOLIC BLOOD PRESSURE: 84 MMHG | BODY MASS INDEX: 41.03 KG/M2 | HEIGHT: 69 IN | HEART RATE: 49 BPM | TEMPERATURE: 98 F | WEIGHT: 277 LBS | OXYGEN SATURATION: 97 %

## 2023-09-21 DIAGNOSIS — H69.93 DYSFUNCTION OF BOTH EUSTACHIAN TUBES: Primary | ICD-10-CM

## 2023-09-21 DIAGNOSIS — H90.0 CONDUCTIVE HEARING LOSS, BILATERAL: ICD-10-CM

## 2023-09-21 DIAGNOSIS — H90.11 CONDUCTIVE HEARING LOSS IN RIGHT EAR: Primary | ICD-10-CM

## 2023-09-21 DIAGNOSIS — H90.72 MIXED HEARING LOSS OF LEFT EAR: ICD-10-CM

## 2023-09-21 PROCEDURE — 99024 POSTOP FOLLOW-UP VISIT: CPT | Performed by: OTOLARYNGOLOGY

## 2023-09-21 PROCEDURE — 92557 COMPREHENSIVE HEARING TEST: CPT | Performed by: AUDIOLOGIST

## 2023-09-21 ASSESSMENT — PAIN SCALES - GENERAL: PAINLEVEL: NO PAIN (0)

## 2023-09-21 NOTE — PATIENT INSTRUCTIONS
You were seen in the ENT Clinic today by Dr. Pete. If you have any questions or concerns after your appointment, please contact us (see below)      2.   Please return to clinic in 6 months.         How to Contact Us:  Send a [employer] message to your provider. Our team will respond to you via [employer]. Occasionally, we will need to call you to get further information.  For urgent matters (Monday-Friday), call the ENT Clinic: [phone] and speak with a call center team member - they will route your call appropriately.   If you'd like to speak directly with a nurse, please find our contact information below. We do our best to check voicemail frequently throughout the day, and will work to call you back within 1-2 days. For urgent matters, please use the general clinic phone numbers listed above.      Christine HEAD RN  ENT RN Care Coordinator  Direct: [phone]    Karo STAPLES LPN  Direct: [phone]

## 2023-09-21 NOTE — LETTER
9/21/2023      RE: Walter Dunn  [address] [address]       Walter Dunn is seen for his second postoperative visit after right middle ear exploration where no residual cholesteatoma was found and no TORP was placed. He reports that his hearing might be better. No pain or drainage on either side.    Physical examination:  male in no acute distress.  Alert and answering questions appropriately.  HB 1/6 bilaterally.  Both ears examined under the microscope. Right ear canal healthy, TM intact with the cartilage graft in the posterior quadrant, t-tube in place, no retractions. Left mastoid cavity fairly clean, TM retracted superiorly and in the antrum with a yellow effusion and air bubbles in the middle ear and antrum, remainder of the posterior mastoid cavity with retracted skin into the cavity.    Audiogram:  Audiogram was independently reviewed and compared to his preoperative test. Right moderate downsloping to severe conductive hearing loss which has worsened from the normal downsloping to severe conductive hearing loss that was present previously. Left mild downsloping to severe conductive hearing loss which is relatively stable. 100% speech discrimination bilaterally. He had not insufflated prior to this audiogram.    Assessment and plan:  Healed right ear after middle ear exploration with an increased conductive hearing loss as no TORP was placed but with an aerated middle ear. The left middle ear has an effusion and the cavity has a retracted lining. I inquired if his hearing improved if he insufflated his ear with Valsalva and he said it improved significantly. We discussed t-tube placement on the left so he would not need to constantly insufflate his ear and he was in agreement.    Procedure:  Left myringotomy and t-tube was discussed and consent obtained. Time Out was performed with confirmation of the patient, site and procedure. Prior to this, he performed a Valsalva and confirmed  that his hearing had improved on the left. Examination of the ear showed that the TM did indeed insufflate outwards as did the majority of his mastoid lining making it impossible to tell where the middle ear space with effusion was previously. A myringotomy incision was made in the anterior superior portion of the middle ear but bleeding was encountered. Another myringotomy incision was made slightly inferior to this and bleeding was again encountered with no obvious middle ear space. The bleeding stopped within a minute and the procedure was aborted. We may attempt this at the next visit and he was cautioned not to insufflate his ear prior to coming into clinic.    We will see him back in 6 months and repeat his audiogram in a year.    Kristin Pete MD

## 2023-09-21 NOTE — PROGRESS NOTES
AUDIOLOGY REPORT    SUMMARY: Audiology visit completed. See audiogram for results.      RECOMMENDATIONS: Follow-up with ENT.     Zak Lafleur. CCC-A  Licensed Audiologist   [place] #[id number]

## 2023-09-21 NOTE — PROGRESS NOTES
Walter Dunn is seen for his second postoperative visit after right middle ear exploration where no residual cholesteatoma was found and no TORP was placed. He reports that his hearing might be better. No pain or drainage on either side.    Physical examination:  male in no acute distress.  Alert and answering questions appropriately.  HB 1/6 bilaterally.  Both ears examined under the microscope. Right ear canal healthy, TM intact with the cartilage graft in the posterior quadrant, t-tube in place, no retractions. Left mastoid cavity fairly clean, TM retracted superiorly and in the antrum with a yellow effusion and air bubbles in the middle ear and antrum, remainder of the posterior mastoid cavity with retracted skin into the cavity.    Audiogram:  Audiogram was independently reviewed and compared to his preoperative test. Right moderate downsloping to severe conductive hearing loss which has worsened from the normal downsloping to severe conductive hearing loss that was present previously. Left mild downsloping to severe conductive hearing loss which is relatively stable. 100% speech discrimination bilaterally. He had not insufflated prior to this audiogram.    Assessment and plan:  Healed right ear after middle ear exploration with an increased conductive hearing loss as no TORP was placed but with an aerated middle ear. The left middle ear has an effusion and the cavity has a retracted lining. I inquired if his hearing improved if he insufflated his ear with Valsalva and he said it improved significantly. We discussed t-tube placement on the left so he would not need to constantly insufflate his ear and he was in agreement.    Procedure:  Left myringotomy and t-tube was discussed and consent obtained. Time Out was performed with confirmation of the patient, site and procedure. Prior to this, he performed a Valsalva and confirmed that his hearing had improved on the left. Examination of the ear showed that the  TM did indeed insufflate outwards as did the majority of his mastoid lining making it impossible to tell where the middle ear space with effusion was previously. A myringotomy incision was made in the anterior superior portion of the middle ear but bleeding was encountered. Another myringotomy incision was made slightly inferior to this and bleeding was again encountered with no obvious middle ear space. The bleeding stopped within a minute and the procedure was aborted. We may attempt this at the next visit and he was cautioned not to insufflate his ear prior to coming into clinic.    We will see him back in 6 months and repeat his audiogram in a year.

## 2023-09-21 NOTE — NURSING NOTE
"Chief Complaint   Patient presents with    RECHECK     1 month follow up with audio     Blood pressure 136/84, pulse (!) 49, temperature 98  F (36.7  C), height 1.753 m (5' 9\"), weight 125.6 kg (277 lb), SpO2 97 %.  Olman Betts LPN    "

## 2023-10-04 ENCOUNTER — TELEPHONE (OUTPATIENT)
Dept: AUDIOLOGY | Facility: CLINIC | Age: 58
End: 2023-10-04
Payer: COMMERCIAL

## 2023-10-04 NOTE — TELEPHONE ENCOUNTER
[facility] Call Center    Phone Message    May a detailed message be left on voicemail: yes     Reason for Call: Other: Pt is requesting a copy his most recent audiogram that was done in Sept. He would like it sent to [facility] in [place]. Please fax to [phone]. Please attention Lucina David      Action Taken: Other: [facility]    Travel Screening: Not Applicable

## 2023-10-16 NOTE — TELEPHONE ENCOUNTER
[facility] Call Center    Phone Message    May a detailed message be left on voicemail: no     Reason for Call: Form or Letter   Type or form/letter needing completion: Copy of most recent hearing test  Provider: Sarah Araiza AuD   Date form needed: ASAP- requested the first time on 10/04/23 with no response  Once completed Fax to: [phone]  Attn: Lucina Hernandez    Action Taken: Message routed to:  [facility] ([facility]    Travel Screening: Not Applicable

## 2024-02-05 ENCOUNTER — TELEPHONE (OUTPATIENT)
Dept: OTOLARYNGOLOGY | Facility: CLINIC | Age: 59
End: 2024-02-05

## 2024-02-05 NOTE — TELEPHONE ENCOUNTER
[facility] Call Center    Phone Message    May a detailed message be left on voicemail: yes     Reason for Call: Other: The pt's wife Rob, states the pt needs to get in sooner than 4.3.24. His symptoms are bronchitis which he is taking meds for, he has tubes in his ears. He was seen at urgent care in [place] and that MD saw something in his ears and said Walter should be seen sooner. Rob is requesting the report from that visit be faxed in. Please call to discuss what the pt can do until he getds in. Thankds.      Action Taken: Message routed to:  [facility] ([facility] ENT    Travel Screening: Not Applicable

## 2024-04-03 ENCOUNTER — OFFICE VISIT (OUTPATIENT)
Dept: OTOLARYNGOLOGY | Facility: CLINIC | Age: 59
End: 2024-04-03

## 2024-04-03 VITALS
DIASTOLIC BLOOD PRESSURE: 82 MMHG | OXYGEN SATURATION: 96 % | HEART RATE: 77 BPM | SYSTOLIC BLOOD PRESSURE: 150 MMHG | WEIGHT: 282 LBS | BODY MASS INDEX: 41.77 KG/M2 | TEMPERATURE: 98.1 F | HEIGHT: 69 IN

## 2024-04-03 DIAGNOSIS — H69.93 DYSFUNCTION OF BOTH EUSTACHIAN TUBES: Primary | ICD-10-CM

## 2024-04-03 DIAGNOSIS — H90.0 CONDUCTIVE HEARING LOSS, BILATERAL: ICD-10-CM

## 2024-04-03 DIAGNOSIS — L92.9 GRANULATION TISSUE: ICD-10-CM

## 2024-04-03 PROCEDURE — 99212 OFFICE O/P EST SF 10 MIN: CPT | Mod: 25 | Performed by: OTOLARYNGOLOGY

## 2024-04-03 PROCEDURE — 92504 EAR MICROSCOPY EXAMINATION: CPT | Performed by: OTOLARYNGOLOGY

## 2024-04-03 ASSESSMENT — PAIN SCALES - GENERAL: PAINLEVEL: NO PAIN (0)

## 2024-04-03 NOTE — PROGRESS NOTES
Walter Dunn is seen for bilateral ear checks. He reports that he had drainage from the right ear a few weeks ago. He doesn't recall being ill at the time. He does think his ear is a bit full although his hearing seems stable. Left ear has been stable.    Physical examination:  male in no acute distress.  Alert and answering questions appropriately.  HB 1/6 bilaterally.  Both ears examined under the microscope. Left mastoid cavity clean and healthy with almost no cerumen, no middle ear effusion noted today. Right ear canal with dried clot which was removed with a straight pick, it is noted that the clot is encasing the t-tube and the lumen was also blocked with clot, the t-tube was removed, the TM is thick and there is granulation tissue on the undersurface of the TM, some of the granulation tissue was able to be removed and the perforation was widened as there was some epithelium starting to grow onto the granulation tissue, the middle ear underneath the granulation tissue is aerated without effusion.    Assessment and plan:  Right bloody otorrhea with granulation tissue seen on the undersurface of a thickened TM. He says he has Floxin and Pred Forte. I would like him to use both 5 drops bid x 14 days and we'll see him back in 6 weeks for a check. He understands that if the perforation appears to be closing, we'll place another t-tube but that one was not placed today due to the granulation tissue.

## 2024-04-03 NOTE — PATIENT INSTRUCTIONS
You were seen in the ENT Clinic today by Dr. Pete. If you have any questions or concerns after your appointment, please contact us (see below)    The following has been recommended for you based upon your appointment today:  Use floxin and pred-forte- 5 drops twice daily x 10 days. Let us know if you need refills    Please return to clinic around 6 weeks    How to Contact Us:  Send a [employer] message to your provider. Our team will respond to you via [employer]. Occasionally, we will need to call you to get further information.  For urgent matters (Monday-Friday), call the ENT Clinic: [phone] and speak with a call center team member - they will route your call appropriately.   If you'd like to speak directly with a nurse, please find our contact information below. We do our best to check voicemail frequently throughout the day, and will work to call you back within 1-2 days. For urgent matters, please use the general clinic phone numbers listed above.    Christine HEAD, RN, BSN  RN Care Coordinator, ENT Clinic  [facility]  Direct: [phone]  Karo MURPHY LPN  Direct: [phone]

## 2024-04-03 NOTE — LETTER
4/3/2024      RE: Walter Dunn  [address]  [place] [address]       Walter Dunn is seen for bilateral ear checks. He reports that he had drainage from the right ear a few weeks ago. He doesn't recall being ill at the time. He does think his ear is a bit full although his hearing seems stable. Left ear has been stable.    Physical examination:  male in no acute distress.  Alert and answering questions appropriately.  HB 1/6 bilaterally.  Both ears examined under the microscope. Left mastoid cavity clean and healthy with almost no cerumen, no middle ear effusion noted today. Right ear canal with dried clot which was removed with a straight pick, it is noted that the clot is encasing the t-tube and the lumen was also blocked with clot, the t-tube was removed, the TM is thick and there is granulation tissue on the undersurface of the TM, some of the granulation tissue was able to be removed and the perforation was widened as there was some epithelium starting to grow onto the granulation tissue, the middle ear underneath the granulation tissue is aerated without effusion.    Assessment and plan:  Right bloody otorrhea with granulation tissue seen on the undersurface of a thickened TM. He says he has Floxin and Pred Forte. I would like him to use both 5 drops bid x 14 days and we'll see him back in 6 weeks for a check. He understands that if the perforation appears to be closing, we'll place another t-tube but that one was not placed today due to the granulation tissue.    Kristin Pete MD

## 2024-04-03 NOTE — NURSING NOTE
"Chief Complaint   Patient presents with    RECHECK     Bilateral eustachian tube dysfunction     Blood pressure (!) 150/82, pulse 77, temperature 98.1  F (36.7  C), height 1.753 m (5' 9\"), weight 127.9 kg (282 lb), SpO2 96%.  Olman Betts LPN    "

## 2024-05-16 ENCOUNTER — OFFICE VISIT (OUTPATIENT)
Dept: OTOLARYNGOLOGY | Facility: CLINIC | Age: 59
End: 2024-05-16

## 2024-05-16 VITALS
HEIGHT: 69 IN | HEART RATE: 90 BPM | SYSTOLIC BLOOD PRESSURE: 114 MMHG | DIASTOLIC BLOOD PRESSURE: 73 MMHG | OXYGEN SATURATION: 94 % | WEIGHT: 285 LBS | TEMPERATURE: 97.9 F | BODY MASS INDEX: 42.21 KG/M2

## 2024-05-16 DIAGNOSIS — H69.93 DYSFUNCTION OF BOTH EUSTACHIAN TUBES: Primary | ICD-10-CM

## 2024-05-16 PROCEDURE — 92504 EAR MICROSCOPY EXAMINATION: CPT | Performed by: OTOLARYNGOLOGY

## 2024-05-16 PROCEDURE — 99212 OFFICE O/P EST SF 10 MIN: CPT | Mod: 25 | Performed by: OTOLARYNGOLOGY

## 2024-05-16 ASSESSMENT — PAIN SCALES - GENERAL: PAINLEVEL: NO PAIN (0)

## 2024-05-16 NOTE — LETTER
"5/16/2024      RE: Walter Dunn  [address]  [place] [address]       Walter Dunn is seen for bilateral ear checks. At the last visit, he reported that he had drainage from the right ear a few weeks ago. He doesn't recall being ill at the time. He does think his ear is a bit full although his hearing seems stable. Left ear has been stable.     Today, he reports no aural fullness, and that he can pop his right ear now and it feels better.    Physical examination:    /73   Pulse 90   Temp 97.9  F (36.6  C)   Ht 1.753 m (5' 9\")   Wt 129.3 kg (285 lb)   SpO2 94%   BMI 42.09 kg/m    male in no acute distress.  Alert and answering questions appropriately.  HB 1/6 bilaterally.  Both ears examined under the microscope.  Right ear canal clean with no formed clot, TM intact with no granulation tissue, TM still somewhat thickened with the cartilage backing visible. Middle ear appears still to be full of blood, although it is evolving as some of it is yellow in color.  Left mastoid cavity clean and healthy.    Assessment and plan:  Right bloody otorrhea with granulation tissue seen on the surface of a thickened TM. He no longer has to use ear drops. He understands that if the TM begins to retract, we'll place another t-tube but that one was not placed today due improvement where he can pop his ears. He is advised that he should continue to attempt to clear his ear several times a day. Follow-up in 3 months.    Scribe Disclosure:   I, Yara Castro, am serving as a scribe; to document services personally performed by Kristin Pete MD -based on data collection and the provider's statements to me.     Provider Disclosure:  I agree with above History, Review of Systems, Physical exam and Plan.  I have reviewed the content of the documentation and have edited it as needed. I have personally performed the services documented here and the documentation accurately represents those services and the " decisions I have made.      Kristin Pete MD

## 2024-05-16 NOTE — NURSING NOTE
"Chief Complaint   Patient presents with    RECHECK     Follow up     Blood pressure 114/73, pulse 90, temperature 97.9  F (36.6  C), height 1.753 m (5' 9\"), weight 129.3 kg (285 lb), SpO2 94%.  Olman Betts LPN    "

## 2024-05-16 NOTE — PROGRESS NOTES
"Walter Dunn is seen for bilateral ear checks. At the last visit, he reported that he had drainage from the right ear a few weeks ago. He doesn't recall being ill at the time. He does think his ear is a bit full although his hearing seems stable. Left ear has been stable.     Today, he reports no aural fullness, and that he can pop his right ear now and it feels better.    Physical examination:    /73   Pulse 90   Temp 97.9  F (36.6  C)   Ht 1.753 m (5' 9\")   Wt 129.3 kg (285 lb)   SpO2 94%   BMI 42.09 kg/m    male in no acute distress.  Alert and answering questions appropriately.  HB 1/6 bilaterally.  Both ears examined under the microscope.  Right ear canal clean with no formed clot, TM intact with no granulation tissue, TM still somewhat thickened with the cartilage backing visible. Middle ear appears still to be full of blood, although it is evolving as some of it is yellow in color.  Left mastoid cavity clean and healthy.    Assessment and plan:  Right bloody otorrhea with granulation tissue seen on the surface of a thickened TM. He no longer has to use ear drops. He understands that if the TM begins to retract, we'll place another t-tube but that one was not placed today due improvement where he can pop his ears. He is advised that he should continue to attempt to clear his ear several times a day. Follow-up in 3 months.    Scribe Disclosure:   I, Yara Castro, am serving as a scribe; to document services personally performed by Kristin Pete MD -based on data collection and the provider's statements to me.     Provider Disclosure:  I agree with above History, Review of Systems, Physical exam and Plan.  I have reviewed the content of the documentation and have edited it as needed. I have personally performed the services documented here and the documentation accurately represents those services and the decisions I have made.    "

## 2024-05-16 NOTE — PATIENT INSTRUCTIONS
You were seen in the ENT Clinic today by Dr. Pete. If you have any questions or concerns after your appointment, please contact us (see below)      Please return to clinic in 3 months    How to Contact Us:  Send a [employer] message to your provider. Our team will respond to you via [employer]. Occasionally, we will need to call you to get further information.  For urgent matters (Monday-Friday), call the ENT Clinic: [phone] and speak with a call center team member - they will route your call appropriately.   If you'd like to speak directly with a nurse, please find our contact information below. We do our best to check voicemail frequently throughout the day, and will work to call you back within 1-2 days. For urgent matters, please use the general clinic phone numbers listed above.    Christine HEAD, RN, BSN  RN Care Coordinator, ENT Clinic  [facility]  Direct: [phone]  Karo MURPHY LPN  Direct: [phone]

## 2024-07-05 ENCOUNTER — TELEPHONE (OUTPATIENT)
Dept: OTOLARYNGOLOGY | Facility: CLINIC | Age: 59
End: 2024-07-05

## 2024-07-05 NOTE — TELEPHONE ENCOUNTER
Left Voicemail (1st Attempt) for the patient to call back and schedule the following:    Appointment type: RNT EAR  Provider: Juan R  Return date: next available  Specialty phone number: [phone]  Additional appointment(s) needed:   Additional Notes: Dr. Pete not here 8/26

## 2024-07-11 ENCOUNTER — TELEPHONE (OUTPATIENT)
Dept: OTOLARYNGOLOGY | Facility: CLINIC | Age: 59
End: 2024-07-11

## 2024-07-12 ENCOUNTER — TELEPHONE (OUTPATIENT)
Dept: OTOLARYNGOLOGY | Facility: CLINIC | Age: 59
End: 2024-07-12

## (undated) DEVICE — SOL WATER IRRIG 500ML BOTTLE 2F7113

## (undated) DEVICE — PACK EAR CUSTOM ASC

## (undated) DEVICE — STPL SKIN PROXIMATE 35 WIDE PMW35

## (undated) DEVICE — NDL 25GA 1.5" 305127

## (undated) DEVICE — SYR 03ML LL W/O NDL 309657

## (undated) DEVICE — TUBING STRYKER IRRIGATION CASSETTE 5400-050-001

## (undated) DEVICE — PREP SKIN SCRUB TRAY 4461A

## (undated) DEVICE — ESU ELEC BLADE 2.75" COATED/INSULATED E1455

## (undated) DEVICE — SUCTION MANIFOLD NEPTUNE 2 SYS 4 PORT 0702-020-000

## (undated) DEVICE — SLEEVE IRRIGATION STRK ELITE 7.0CM 5/PKG 5407-010-450

## (undated) DEVICE — PREP POVIDONE IODINE SOLUTION 10% 4OZ BOTTLE 29906-004

## (undated) DEVICE — BONE WAX 2.5GM W31G

## (undated) DEVICE — GLOVE BIOGEL PI MICRO SZ 6.5 48565

## (undated) DEVICE — DRSG BANDAID 1X3" FABRIC CURITY LATEX FREE KC44101

## (undated) DEVICE — BLADE KNIFE BEAVER MINI BEAVER6400

## (undated) DEVICE — DRAPE MICROSCOPE MICRO-KOVER LEICA 48"X120" 09-MK651

## (undated) DEVICE — SOL NACL 0.9% IRRIG 500ML BOTTLE 2F7123

## (undated) DEVICE — SPONGE SURGIFOAM 100 1974

## (undated) DEVICE — BLADE KNIFE BEAVER TYMPANOPLASTY 2.5MM W/60D DOWN 377200

## (undated) DEVICE — STRAP KNEE/BODY 31143004

## (undated) DEVICE — GLOVE PROTEXIS POWDER FREE SMT 6.5  2D72PT65X

## (undated) DEVICE — NIM ELEC SUBDERMAL NDL PAIRED 2 CHANNEL 8227410

## (undated) DEVICE — BLADE KNIFE BEAVER MYRINGOTOMY 7121

## (undated) DEVICE — WIPE INSTRUMENT MEROCEL 400200

## (undated) DEVICE — POSITIONER ARMBOARD FOAM 1PAIR LF FP-ARMB1

## (undated) DEVICE — DRAPE MICROSCOPE LEICA 54X150" AR8033650

## (undated) DEVICE — LINEN TOWEL PACK X5 5464

## (undated) DEVICE — POSITIONER HEAD DONUT FOAM 9" LF FP-HEAD9

## (undated) DEVICE — SOL NACL 0.9% INJ 1000ML BAG 2B1324X

## (undated) DEVICE — Device

## (undated) DEVICE — PREP POVIDONE-IODINE 7.5% SCRUB 4OZ BOTTLE MDS093945

## (undated) DEVICE — DRSG COTTON BALL 6PK LCB62

## (undated) DEVICE — PACK PEDS MYRINGOTOMY CUSTOM SEN15PMRM2

## (undated) DEVICE — SYR 10ML PREFILLED 0.9% NACL INJ NOT STERILE 306547

## (undated) DEVICE — SYR 10ML LL W/O NDL

## (undated) DEVICE — ESU GROUND PAD ADULT W/CORD E7507

## (undated) DEVICE — SU VICRYL 3-0 X-1 27" UND J458H

## (undated) DEVICE — ESU GROUND PAD UNIVERSAL W/O CORD

## (undated) DEVICE — SU CHROMIC 4-0 P-3 18" 1654G

## (undated) DEVICE — NDL ANGIOCATH 14GA 1.25" 4048

## (undated) DEVICE — DRAPE U SPLIT 74X120" 29440

## (undated) DEVICE — ESU CORD BIPOLAR GREEN 10-4000

## (undated) DEVICE — NIM ELEC SUBDERMAL NDL 3PAIR/BOX

## (undated) DEVICE — SOL WATER IRRIG 1000ML BOTTLE 2F7114

## (undated) RX ORDER — FENTANYL CITRATE-0.9 % NACL/PF 10 MCG/ML
PLASTIC BAG, INJECTION (ML) INTRAVENOUS
Status: DISPENSED
Start: 2023-07-28

## (undated) RX ORDER — PROPOFOL 10 MG/ML
INJECTION, EMULSION INTRAVENOUS
Status: DISPENSED
Start: 2023-07-28

## (undated) RX ORDER — ONDANSETRON 2 MG/ML
INJECTION INTRAMUSCULAR; INTRAVENOUS
Status: DISPENSED
Start: 2023-01-20

## (undated) RX ORDER — ONDANSETRON 2 MG/ML
INJECTION INTRAMUSCULAR; INTRAVENOUS
Status: DISPENSED
Start: 2023-07-28

## (undated) RX ORDER — EPHEDRINE SULFATE 50 MG/ML
INJECTION, SOLUTION INTRAMUSCULAR; INTRAVENOUS; SUBCUTANEOUS
Status: DISPENSED
Start: 2023-01-20

## (undated) RX ORDER — ACETAMINOPHEN 325 MG/1
TABLET ORAL
Status: DISPENSED
Start: 2023-07-28

## (undated) RX ORDER — CEFAZOLIN SODIUM 1 G/3ML
INJECTION, POWDER, FOR SOLUTION INTRAMUSCULAR; INTRAVENOUS
Status: DISPENSED
Start: 2023-07-28

## (undated) RX ORDER — DEXAMETHASONE SODIUM PHOSPHATE 4 MG/ML
INJECTION, SOLUTION INTRA-ARTICULAR; INTRALESIONAL; INTRAMUSCULAR; INTRAVENOUS; SOFT TISSUE
Status: DISPENSED
Start: 2023-07-28

## (undated) RX ORDER — ACETAMINOPHEN 325 MG/1
TABLET ORAL
Status: DISPENSED
Start: 2023-01-20

## (undated) RX ORDER — FENTANYL CITRATE-0.9 % NACL/PF 10 MCG/ML
PLASTIC BAG, INJECTION (ML) INTRAVENOUS
Status: DISPENSED
Start: 2023-01-20

## (undated) RX ORDER — DEXAMETHASONE SODIUM PHOSPHATE 4 MG/ML
INJECTION, SOLUTION INTRA-ARTICULAR; INTRALESIONAL; INTRAMUSCULAR; INTRAVENOUS; SOFT TISSUE
Status: DISPENSED
Start: 2023-01-20

## (undated) RX ORDER — OXYCODONE HYDROCHLORIDE 5 MG/1
TABLET ORAL
Status: DISPENSED
Start: 2023-01-20

## (undated) RX ORDER — FENTANYL CITRATE 50 UG/ML
INJECTION, SOLUTION INTRAMUSCULAR; INTRAVENOUS
Status: DISPENSED
Start: 2023-07-28

## (undated) RX ORDER — PROPOFOL 10 MG/ML
INJECTION, EMULSION INTRAVENOUS
Status: DISPENSED
Start: 2023-01-20

## (undated) RX ORDER — HYDROMORPHONE HYDROCHLORIDE 1 MG/ML
INJECTION, SOLUTION INTRAMUSCULAR; INTRAVENOUS; SUBCUTANEOUS
Status: DISPENSED
Start: 2023-07-28

## (undated) RX ORDER — GLYCOPYRROLATE 0.2 MG/ML
INJECTION, SOLUTION INTRAMUSCULAR; INTRAVENOUS
Status: DISPENSED
Start: 2023-01-20

## (undated) RX ORDER — REMIFENTANIL HYDROCHLORIDE 1 MG/ML
INJECTION, POWDER, LYOPHILIZED, FOR SOLUTION INTRAVENOUS
Status: DISPENSED
Start: 2023-07-28

## (undated) RX ORDER — FENTANYL CITRATE 50 UG/ML
INJECTION, SOLUTION INTRAMUSCULAR; INTRAVENOUS
Status: DISPENSED
Start: 2023-01-20

## (undated) RX ORDER — KETOROLAC TROMETHAMINE 30 MG/ML
INJECTION, SOLUTION INTRAMUSCULAR; INTRAVENOUS
Status: DISPENSED
Start: 2023-07-28

## (undated) RX ORDER — EPHEDRINE SULFATE 50 MG/ML
INJECTION, SOLUTION INTRAVENOUS
Status: DISPENSED
Start: 2023-07-28